# Patient Record
Sex: FEMALE | Race: WHITE | NOT HISPANIC OR LATINO | Employment: OTHER | URBAN - METROPOLITAN AREA
[De-identification: names, ages, dates, MRNs, and addresses within clinical notes are randomized per-mention and may not be internally consistent; named-entity substitution may affect disease eponyms.]

---

## 2018-03-08 ENCOUNTER — OFFICE VISIT (OUTPATIENT)
Dept: MEDICAL GROUP | Facility: PHYSICIAN GROUP | Age: 63
End: 2018-03-08
Payer: COMMERCIAL

## 2018-03-08 VITALS
OXYGEN SATURATION: 94 % | SYSTOLIC BLOOD PRESSURE: 116 MMHG | HEART RATE: 83 BPM | DIASTOLIC BLOOD PRESSURE: 62 MMHG | BODY MASS INDEX: 30.49 KG/M2 | TEMPERATURE: 97.5 F | RESPIRATION RATE: 16 BRPM | WEIGHT: 183 LBS | HEIGHT: 65 IN

## 2018-03-08 DIAGNOSIS — L98.9 SKIN LESION OF FACE: ICD-10-CM

## 2018-03-08 DIAGNOSIS — E03.4 HYPOTHYROIDISM DUE TO ACQUIRED ATROPHY OF THYROID: ICD-10-CM

## 2018-03-08 DIAGNOSIS — Z85.820 HISTORY OF MALIGNANT MELANOMA OF SKIN: ICD-10-CM

## 2018-03-08 DIAGNOSIS — F41.9 ANXIETY: ICD-10-CM

## 2018-03-08 DIAGNOSIS — E78.2 MIXED HYPERLIPIDEMIA: ICD-10-CM

## 2018-03-08 DIAGNOSIS — E66.9 OBESITY (BMI 30-39.9): ICD-10-CM

## 2018-03-08 PROCEDURE — 99214 OFFICE O/P EST MOD 30 MIN: CPT | Performed by: NURSE PRACTITIONER

## 2018-03-08 RX ORDER — ATORVASTATIN CALCIUM 10 MG/1
TABLET, FILM COATED ORAL
COMMUNITY
Start: 2018-02-02 | End: 2018-10-15 | Stop reason: SINTOL

## 2018-03-08 RX ORDER — SERTRALINE HYDROCHLORIDE 25 MG/1
25 TABLET, FILM COATED ORAL DAILY
Qty: 30 TAB | Refills: 0 | Status: SHIPPED | OUTPATIENT
Start: 2018-03-08 | End: 2019-08-20

## 2018-03-08 RX ORDER — LEVOTHYROXINE SODIUM 0.05 MG/1
TABLET ORAL
COMMUNITY
Start: 2018-03-04 | End: 2019-11-01 | Stop reason: SDUPTHER

## 2018-03-08 ASSESSMENT — PATIENT HEALTH QUESTIONNAIRE - PHQ9
CLINICAL INTERPRETATION OF PHQ2 SCORE: 6
5. POOR APPETITE OR OVEREATING: 0 - NOT AT ALL
SUM OF ALL RESPONSES TO PHQ QUESTIONS 1-9: 8

## 2018-03-08 ASSESSMENT — PAIN SCALES - GENERAL: PAINLEVEL: NO PAIN

## 2018-03-08 NOTE — ASSESSMENT & PLAN NOTE
"This is a new problem. Patient states that she has a new job over the last 6 months states that she has had high stress. Patient states that daily she is worrying constantly, feels like she has to protect herself against her coworkers. States that she is constantly stressed, states she has been irritable and more \"snappy\".  "

## 2018-03-08 NOTE — ASSESSMENT & PLAN NOTE
This is a new problem. Patient states that over the last couple of months she has noticed 2 new moles. One near her left upper ear and then right temple. These have grown rapidly and are in the site of previous melanoma. Patient is concerned with these new growths and would like evaluation, removal of these lesions.

## 2018-03-08 NOTE — ASSESSMENT & PLAN NOTE
"Labwork  up to date. Taking medicine as directed. Denies palpitations, skin changes, temperature intolerance, changes in bowel habits. States that she has felt \"different\".    "

## 2018-03-08 NOTE — PROGRESS NOTES
"Chief Complaint   Patient presents with   • Establish Care     New Patient    • Nevus     L side on top of ear        HISTORY OF THE PRESENT ILLNESS: This is a 62 y.o. female new patient to our practice. This pleasant patient is here today to discuss multiple issues including her thyroid.     Skin lesion of face  This is a new problem. Patient states that over the last couple of months she has noticed 2 new moles. One near her left upper ear and then right temple. These have grown rapidly and are in the site of previous melanoma. Patient is concerned with these new growths and would like evaluation, removal of these lesions.    Obesity (BMI 30-39.9)  Chronic in nature. Stable. Patient is counseled regarding healthy diet and exercise.    Mixed hyperlipidemia  Chronic in nature. Stable. Patient states that last labs were within normal limits.    Hypothyroidism due to acquired atrophy of thyroid  Labwork  up to date. Taking medicine as directed. Denies palpitations, skin changes, temperature intolerance, changes in bowel habits. States that she has felt \"different\".      Anxiety  This is a new problem. Patient states that she has a new job over the last 6 months states that she has had high stress. Patient states that daily she is worrying constantly, feels like she has to protect herself against her coworkers. States that she is constantly stressed, states she has been irritable and more \"snappy\".      Past Medical History:   Diagnosis Date   • Hyperlipidemia    • Hypothyroidism        Past Surgical History:   Procedure Laterality Date   • BIOPSY GENERAL      skin       Family Status   Relation Status   • Mother    • Father      Family History   Problem Relation Age of Onset   • Heart Disease Mother      CHF   • Thyroid Mother    • Seizures Mother    • Cancer Father      skin   • Heart Disease Father 54     MI       Social History   Substance Use Topics   • Smoking status: Never Smoker   • Smokeless " "tobacco: Never Used   • Alcohol use Yes      Comment: occ        Allergies: Patient has no known allergies.    Current Outpatient Prescriptions Ordered in [facility]   Medication Sig Dispense Refill   • atorvastatin (LIPITOR) 10 MG Tab      • levothyroxine (SYNTHROID) 50 MCG Tab      • sertraline (ZOLOFT) 25 MG tablet Take 1 Tab by mouth every day. 30 Tab 0     No current Epic-ordered facility-administered medications on file.        Review of Systems   Constitutional:  Negative for fever, chills, weight loss and malaise/fatigue.   HENT:  Negative for ear pain, nosebleeds, congestion, sore throat and neck pain.    Eyes:  Negative for blurred vision.   Respiratory:  Negative for cough, sputum production, shortness of breath and wheezing.    Cardiovascular:  Negative for chest pain, palpitations, orthopnea and leg swelling.   Gastrointestinal:  Negative for heartburn, nausea, vomiting and abdominal pain.   Genitourinary:  Negative for dysuria, urgency and frequency.   Musculoskeletal:  Negative for myalgias, back pain and joint pain.   Skin:  Negative for rash and itching.   Neurological:  Negative for dizziness, tingling, tremors, sensory change, focal weakness and headaches.   Endo/Heme/Allergies:  Does not bruise/bleed easily.   Psychiatric/Behavioral:  Negative for depression, anxiety, or memory loss.     All other systems reviewed and are negative except as in HPI.    Exam: Blood pressure 116/62, pulse 83, temperature 36.4 °C (97.5 °F), resp. rate 16, height 1.651 m (5' 5\"), weight 83 kg (183 lb), SpO2 94 %, not currently breastfeeding.  General:  Normal appearing. No distress.  HEENT:  Normocephalic. Eyes conjunctiva clear lids without ptosis, pupils equal and reactive to light accommodation, ears normal shape and contour, canals are clear bilaterally, tympanic membranes are benign, nasal mucosa benign, oropharynx is without erythema, edema or exudates.   Neck:  Supple without JVD or bruit. Thyroid is not " enlarged.  Pulmonary:  Clear to ausculation.  Normal effort. No rales, ronchi, or wheezing.  Cardiovascular:  Regular rate and rhythm without murmur. Carotid and radial pulses are intact and equal bilaterally.  Abdomen:  Soft, nontender, nondistended. Normal bowel sounds. Liver and spleen are not palpable  Neurologic:  Grossly nonfocal  Lymph:  No cervical, supraclavicular or axillary lymph nodes are palpable  Skin:  Warm and dry.  Patient has a red irritated lesion near her left upper ER, patient also has a small round red lesion on her right temple both lesions are inflamed, irritated, greater than the size of a pencil eraser.  Musculoskeletal:  Normal gait. No extremity cyanosis, clubbing, or edema.  Psych:  Normal mood and affect. Alert and oriented x3. Judgment and insight is normal.    PLAN:    1. Obesity (BMI 30-39.9)  - Patient identified as having weight management issue.  Appropriate orders and counseling given.    2. Skin lesion of face  Referral for urgent follow-up with dermatology related to new lesions at site of previous melanoma which are growing rapidly.    3. Hypothyroidism due to acquired atrophy of thyroid  Continue current medications Synthroid 50 µg plan to recheck labs to verify appropriateness of dose.  - TSH+FREE T4    4. Mixed hyperlipidemia  Continue current medication.  - COMP METABOLIC PANEL; Future  - LIPID PROFILE; Future    5. History of malignant melanoma of skin  - REFERRAL TO DERMATOLOGY    6. Anxiety  Plan to start Zoloft 25 mg daily patient is counseled regarding risks, benefits, side effects of medication. Patient will follow-up in one month to discuss medication.   - sertraline (ZOLOFT) 25 MG tablet; Take 1 Tab by mouth every day.  Dispense: 30 Tab; Refill: 0    Follow-up in one month or sooner if needed. Patient is encouraged to be seen in the emergency room for chest pain, palpitations, shortness of breath, dizziness, severe abdominal pain or other concerning  symptoms.    Please note that this dictation was created using voice recognition software. I have made every reasonable attempt to correct obvious errors, but I expect that there are errors of grammar and possibly content that I did not discover before finalizing the note.      Assessment/Plan  1. Obesity (BMI 30-39.9)  Patient identified as having weight management issue.  Appropriate orders and counseling given.   2. Skin lesion of face     3. Hypothyroidism due to acquired atrophy of thyroid  TSH+FREE T4   4. Mixed hyperlipidemia  COMP METABOLIC PANEL    LIPID PROFILE   5. History of malignant melanoma of skin  REFERRAL TO DERMATOLOGY   6. Anxiety  sertraline (ZOLOFT) 25 MG tablet         I have placed the below orders and discussed them with an approved delegating provider. The MA is performing the below orders under the direction of Dr. Sims.

## 2018-03-08 NOTE — LETTER
[facility]  VALENTE Valerio.P.RGONZÁLEZ  [address]  [place] [address]  Fax: [phone]   Authorization for Release/Disclosure of   Protected Health Information   Name: KIM BURNS : 1955 SSN: [id number]   Address: [facility] [address]  [place] [address] Phone:    [phone] (home)    I authorize the entity listed below to release/disclose the PHI below to:   [facility]/Collin Gaona, A.P.R.N. and VALENTE Valerio.P.R.PELON.   Provider or Entity Name:  [facility] Address   City, [facility]   Phone:      Fax:     Reason for request: continuity of care   Information to be released:    [  ] LAST COLONOSCOPY,  including any PATH REPORT and follow-up  [  ] LAST FIT/COLOGUARD RESULT [  ] LAST DEXA  [  ] LAST MAMMOGRAM  [  ] LAST PAP  [  ] LAST LABS [  ] RETINA EXAM REPORT  [  ] IMMUNIZATION RECORDS  [  x] Release all info      [  ] Check here and initial the line next to each item to release ALL health information INCLUDING  _____ Care and treatment for drug and / or alcohol abuse  _____ HIV testing, infection status, or AIDS  _____ Genetic Testing    DATES OF SERVICE OR TIME PERIOD TO BE DISCLOSED: _____________  I understand and acknowledge that:  * This Authorization may be revoked at any time by you in writing, except if your health information has already been used or disclosed.  * Your health information that will be used or disclosed as a result of you signing this authorization could be re-disclosed by the recipient. If this occurs, your re-disclosed health information may no longer be protected by State or Federal laws.  * You may refuse to sign this Authorization. Your refusal will not affect your ability to obtain treatment.  * This Authorization becomes effective upon signing and will  on (date) __________.      If no date is indicated, this Authorization will  one (1) year from the signature date.    Name: Kim Burns    Signature:   Date:     3/8/2018       PLEASE FAX REQUESTED RECORDS BACK TO: [phone]

## 2018-03-13 ENCOUNTER — TELEPHONE (OUTPATIENT)
Dept: MEDICAL GROUP | Facility: PHYSICIAN GROUP | Age: 63
End: 2018-03-13

## 2018-03-13 NOTE — TELEPHONE ENCOUNTER
Patient called and wanted to let you know she got into Derm. She got an appointment in May. Patient said she got on the cancelations list. Let patient know I would let Collin know. Patient wanted to know if there was anything that could be done to be seen sooner.    Please Advise. GITA

## 2018-03-15 NOTE — TELEPHONE ENCOUNTER
VOICEMAIL  1. Caller Name: Kim Mcginnis                        Call Back Number: [phone] (home)       2. Message: Called and left patient a message to call back regarding referral to Derm.     Called Derm and spoke to Shawna. She made patient a sooner appointment 03/30/2018 @9:45am. Shawna said if patient can show up at 9:30am would be helpful.     3. Patient approves office to leave a detailed voicemail/MyChart message: yes

## 2018-03-30 ENCOUNTER — OFFICE VISIT (OUTPATIENT)
Dept: DERMATOLOGY | Facility: IMAGING CENTER | Age: 63
End: 2018-03-30
Payer: COMMERCIAL

## 2018-03-30 VITALS
DIASTOLIC BLOOD PRESSURE: 68 MMHG | BODY MASS INDEX: 28.61 KG/M2 | SYSTOLIC BLOOD PRESSURE: 118 MMHG | WEIGHT: 178 LBS | HEIGHT: 66 IN | TEMPERATURE: 98.6 F

## 2018-03-30 DIAGNOSIS — Z85.820 HISTORY OF MALIGNANT MELANOMA OF SKIN: ICD-10-CM

## 2018-03-30 DIAGNOSIS — L98.9 SKIN LESION OF FACE: ICD-10-CM

## 2018-03-30 PROCEDURE — 99213 OFFICE O/P EST LOW 20 MIN: CPT | Performed by: NURSE PRACTITIONER

## 2018-03-30 NOTE — ASSESSMENT & PLAN NOTE
Patient reports she was treated for a melanoma years ago in [facility] with excision.

## 2018-04-05 ENCOUNTER — APPOINTMENT (OUTPATIENT)
Dept: MEDICAL GROUP | Facility: PHYSICIAN GROUP | Age: 63
End: 2018-04-05
Payer: COMMERCIAL

## 2018-04-06 ENCOUNTER — PATIENT MESSAGE (OUTPATIENT)
Dept: MEDICAL GROUP | Facility: PHYSICIAN GROUP | Age: 63
End: 2018-04-06

## 2018-04-06 DIAGNOSIS — R17 ELEVATED BILIRUBIN: ICD-10-CM

## 2018-08-14 NOTE — PROGRESS NOTES
"Chief Complaint   Patient presents with   • Other     skin check          HISTORY OF THE PRESENT ILLNESS: Patient is a 62 y.o. female. This pleasant patient is here today with concerns regarding her skin. She has a history of malignant melanoma excised some years ago. She has no known family history of skin cancer. She has never smoked or chewed tobacco.      History of malignant melanoma of skin  Patient reports she was treated for a melanoma years ago in [facility] with excision.     Skin lesion of face  Patient states she noticed a rough lesion near her left ear that has since resolved. Also a rough area near her incision on her previous melanoma excision.       Allergies: Patient has no known allergies.    Current Outpatient Prescriptions   Medication Sig Dispense Refill   • atorvastatin (LIPITOR) 10 MG Tab      • levothyroxine (SYNTHROID) 50 MCG Tab      • sertraline (ZOLOFT) 25 MG tablet Take 1 Tab by mouth every day. 30 Tab 0     No current facility-administered medications for this visit.        Past Medical History:   Diagnosis Date   • History of malignant melanoma of skin 3/30/2018   • Hyperlipidemia    • Hypothyroidism        Past Surgical History:   Procedure Laterality Date   • BIOPSY GENERAL      skin       Social History   Substance Use Topics   • Smoking status: Never Smoker   • Smokeless tobacco: Never Used   • Alcohol use Yes      Comment: occ        Family Status   Relation Status   • Mother    • Father      Family History   Problem Relation Age of Onset   • Heart Disease Mother      CHF   • Thyroid Mother    • Seizures Mother    • Cancer Father      skin   • Heart Disease Father 54     MI       Review of Systems   Constitutional: Negative for fever, chills, weight loss and malaise/fatigue.   Skin: she has felt to rough areas on her skin one of which is resolved completely.    Exam: Blood pressure 118/68, temperature 37 °C (98.6 °F), height 1.676 m (5' 6\"), weight 80.7 kg " (178 lb).  General: Normal appearing. No distress.  An examination was performed including the scalp( including the hair) , head and face, inspection of eyelids, lips , right and left ear externally but not ear canals.  Neck and chest and back.  Including  Both upper and lower extremities, including hands and feet and nails and between fingers and toes.     All areas were found to be within normal limits except as noted in the description below.     Face: Scattered lentigo more on the left than the right.  . Patient describes near her left ear is resolved completely  Right lateral face near the right eye and temple area incision consistent with patient's described excision and flap. Area she describes as feeling rough is patch of dry flaky skin. No lesion is visualized.  Chest with multiple lentigo  and benign-appearing light brown nevi.  I'm unable to evaluate her toenails as they are painted but they'll polish.             Please note that this dictation was created using voice recognition software. I have made every reasonable attempt to correct obvious errors, but I expect that there are errors of grammar and possibly content that I did not discover before finalizing the note.      Assessment/Plan  1. History of malignant melanoma of skin  Patient feels she has adequate information regarding melanoma she watches her skin carefully and now uses sun screen and sun protective clothing.    2. Skin lesion of face  Area she describes on the left is resolved completely area on the right appears to be only dry skin.    Encouraged yearly skin checks. Encouraged her to check her skin carefully and return if she finds anything concerning or if the lesion on her left side becomes visible again.   No

## 2018-09-06 ENCOUNTER — HOSPITAL ENCOUNTER (OUTPATIENT)
Dept: LAB | Facility: MEDICAL CENTER | Age: 63
End: 2018-09-06
Attending: NURSE PRACTITIONER
Payer: MEDICARE

## 2018-09-06 LAB
BILIRUB CONJ SERPL-MCNC: 0.2 MG/DL (ref 0.1–0.5)
BILIRUB INDIRECT SERPL-MCNC: 1.3 MG/DL (ref 0–1)
BILIRUB SERPL-MCNC: 1.5 MG/DL (ref 0.1–1.5)

## 2018-09-06 PROCEDURE — 82247 BILIRUBIN TOTAL: CPT

## 2018-09-06 PROCEDURE — 36415 COLL VENOUS BLD VENIPUNCTURE: CPT

## 2018-09-06 PROCEDURE — 82248 BILIRUBIN DIRECT: CPT

## 2018-10-15 ENCOUNTER — TELEPHONE (OUTPATIENT)
Dept: MEDICAL GROUP | Facility: PHYSICIAN GROUP | Age: 63
End: 2018-10-15

## 2018-10-15 DIAGNOSIS — E78.2 MIXED HYPERLIPIDEMIA: ICD-10-CM

## 2018-10-15 RX ORDER — ROSUVASTATIN CALCIUM 10 MG/1
10 TABLET, COATED ORAL EVERY EVENING
Qty: 30 TAB | Refills: 11 | Status: SHIPPED | OUTPATIENT
Start: 2018-10-15 | End: 2019-08-20

## 2018-10-16 NOTE — TELEPHONE ENCOUNTER
I sent a different medication called rosuvastatin to the pharmacy.  If patient has side effects on this medication I would recommend that she come in for an appointment.

## 2018-10-16 NOTE — TELEPHONE ENCOUNTER
Phone Number Called: [phone] (home)       Message: Called and left patient a message. Let her know different medication was sent to the pharmacy. Lm     Left Message for patient to call back: yes

## 2018-10-16 NOTE — TELEPHONE ENCOUNTER
Pt came into the clinic today and stated the new medication that was prescribed for cholesterol is not sitting well with her and she is getting dizzy and losing focus. She paid for the 90 days and would like another Rx or does she need an appt. Please call the patient with any questions.

## 2019-08-20 ENCOUNTER — HOSPITAL ENCOUNTER (OUTPATIENT)
Dept: LAB | Facility: MEDICAL CENTER | Age: 64
End: 2019-08-20
Attending: NURSE PRACTITIONER
Payer: MEDICARE

## 2019-08-20 ENCOUNTER — OFFICE VISIT (OUTPATIENT)
Dept: MEDICAL GROUP | Facility: PHYSICIAN GROUP | Age: 64
End: 2019-08-20
Payer: MEDICARE

## 2019-08-20 VITALS
HEIGHT: 65 IN | BODY MASS INDEX: 30.82 KG/M2 | HEART RATE: 87 BPM | DIASTOLIC BLOOD PRESSURE: 80 MMHG | TEMPERATURE: 97.6 F | OXYGEN SATURATION: 96 % | RESPIRATION RATE: 14 BRPM | WEIGHT: 185 LBS | SYSTOLIC BLOOD PRESSURE: 124 MMHG

## 2019-08-20 DIAGNOSIS — Z85.820 PERSONAL HISTORY OF MALIGNANT MELANOMA: ICD-10-CM

## 2019-08-20 DIAGNOSIS — E66.9 OBESITY (BMI 30-39.9): ICD-10-CM

## 2019-08-20 DIAGNOSIS — Z78.0 POST-MENOPAUSAL: ICD-10-CM

## 2019-08-20 DIAGNOSIS — Z12.31 ENCOUNTER FOR SCREENING MAMMOGRAM FOR BREAST CANCER: ICD-10-CM

## 2019-08-20 DIAGNOSIS — E03.4 HYPOTHYROIDISM DUE TO ACQUIRED ATROPHY OF THYROID: ICD-10-CM

## 2019-08-20 DIAGNOSIS — E78.2 MIXED HYPERLIPIDEMIA: ICD-10-CM

## 2019-08-20 LAB
ALBUMIN SERPL BCP-MCNC: 4.2 G/DL (ref 3.2–4.9)
ALBUMIN/GLOB SERPL: 1.8 G/DL
ALP SERPL-CCNC: 41 U/L (ref 30–99)
ALT SERPL-CCNC: 21 U/L (ref 2–50)
ANION GAP SERPL CALC-SCNC: 9 MMOL/L (ref 0–11.9)
AST SERPL-CCNC: 17 U/L (ref 12–45)
BASOPHILS # BLD AUTO: 0.3 % (ref 0–1.8)
BASOPHILS # BLD: 0.02 K/UL (ref 0–0.12)
BILIRUB SERPL-MCNC: 1.3 MG/DL (ref 0.1–1.5)
BUN SERPL-MCNC: 14 MG/DL (ref 8–22)
CALCIUM SERPL-MCNC: 9.7 MG/DL (ref 8.5–10.5)
CHLORIDE SERPL-SCNC: 103 MMOL/L (ref 96–112)
CHOLEST SERPL-MCNC: 298 MG/DL (ref 100–199)
CO2 SERPL-SCNC: 27 MMOL/L (ref 20–33)
CREAT SERPL-MCNC: 0.86 MG/DL (ref 0.5–1.4)
EOSINOPHIL # BLD AUTO: 0.19 K/UL (ref 0–0.51)
EOSINOPHIL NFR BLD: 3.1 % (ref 0–6.9)
ERYTHROCYTE [DISTWIDTH] IN BLOOD BY AUTOMATED COUNT: 46.9 FL (ref 35.9–50)
FASTING STATUS PATIENT QL REPORTED: NORMAL
GLOBULIN SER CALC-MCNC: 2.3 G/DL (ref 1.9–3.5)
GLUCOSE SERPL-MCNC: 84 MG/DL (ref 65–99)
HCT VFR BLD AUTO: 46.1 % (ref 37–47)
HDLC SERPL-MCNC: 45 MG/DL
HGB BLD-MCNC: 14.7 G/DL (ref 12–16)
IMM GRANULOCYTES # BLD AUTO: 0.01 K/UL (ref 0–0.11)
IMM GRANULOCYTES NFR BLD AUTO: 0.2 % (ref 0–0.9)
LDLC SERPL CALC-MCNC: 177 MG/DL
LYMPHOCYTES # BLD AUTO: 2.5 K/UL (ref 1–4.8)
LYMPHOCYTES NFR BLD: 40.3 % (ref 22–41)
MCH RBC QN AUTO: 30.2 PG (ref 27–33)
MCHC RBC AUTO-ENTMCNC: 31.9 G/DL (ref 33.6–35)
MCV RBC AUTO: 94.7 FL (ref 81.4–97.8)
MONOCYTES # BLD AUTO: 0.24 K/UL (ref 0–0.85)
MONOCYTES NFR BLD AUTO: 3.9 % (ref 0–13.4)
NEUTROPHILS # BLD AUTO: 3.25 K/UL (ref 2–7.15)
NEUTROPHILS NFR BLD: 52.2 % (ref 44–72)
NRBC # BLD AUTO: 0 K/UL
NRBC BLD-RTO: 0 /100 WBC
PLATELET # BLD AUTO: 157 K/UL (ref 164–446)
PMV BLD AUTO: 12.2 FL (ref 9–12.9)
POTASSIUM SERPL-SCNC: 4 MMOL/L (ref 3.6–5.5)
PROT SERPL-MCNC: 6.5 G/DL (ref 6–8.2)
RBC # BLD AUTO: 4.87 M/UL (ref 4.2–5.4)
SODIUM SERPL-SCNC: 139 MMOL/L (ref 135–145)
TRIGL SERPL-MCNC: 382 MG/DL (ref 0–149)
WBC # BLD AUTO: 6.2 K/UL (ref 4.8–10.8)

## 2019-08-20 PROCEDURE — 99214 OFFICE O/P EST MOD 30 MIN: CPT | Performed by: NURSE PRACTITIONER

## 2019-08-20 PROCEDURE — 85025 COMPLETE CBC W/AUTO DIFF WBC: CPT

## 2019-08-20 PROCEDURE — 80061 LIPID PANEL: CPT

## 2019-08-20 PROCEDURE — 80053 COMPREHEN METABOLIC PANEL: CPT

## 2019-08-20 PROCEDURE — 36415 COLL VENOUS BLD VENIPUNCTURE: CPT

## 2019-08-20 PROCEDURE — 84443 ASSAY THYROID STIM HORMONE: CPT

## 2019-08-20 RX ORDER — PREDNISONE 5 MG/1
TABLET ORAL
Refills: 0 | COMMUNITY
Start: 2019-06-16 | End: 2019-08-20

## 2019-08-20 RX ORDER — AMOXICILLIN AND CLAVULANATE POTASSIUM 875; 125 MG/1; MG/1
1 TABLET, FILM COATED ORAL
Refills: 0 | COMMUNITY
Start: 2019-06-16 | End: 2019-08-20

## 2019-08-20 ASSESSMENT — PATIENT HEALTH QUESTIONNAIRE - PHQ9: CLINICAL INTERPRETATION OF PHQ2 SCORE: 0

## 2019-08-20 NOTE — PROGRESS NOTES
Chief Complaint   Patient presents with   • Orders Needed     Labs        HISTORY OF THE PRESENT ILLNESS: This is a 64 y.o. female established patient who presents today for follow up and management of:    Hypothyroidism due to acquired atrophy of thyroid  Chronic. Patient continues to take levothyroxine. No reports of medication side effects. However, patient states she has had fatigue. States she used to sleep for 7-8 hours but now sleeps for 10 hours each night. She is able to fall asleep easily.    Mixed hyperlipidemia  Chronic. Patient was on statin medication but states she has discontinued it due to intolerable side effects. No reports of any active associated symptoms regarding hyperlipidemia. No reports of chest pain, shortness of breath, abdominal pain, or headaches.     Post-menopausal  Patient is due for DEXA. No reports of any active associated symptoms.    Encounter for screening mammogram for breast cancer  Patient is due for mammogram. No reports of any active breast symptoms.    Obesity (BMI 30-39.9)  Patient states she has been exercising more and watching her diet. States she is going back to [facility] where she will be doing regular walks on the beach.    Personal history of malignant melanoma  Chronic. Patient states she had a melanoma scare about 5 years ago due to a spot on the right side of her face. States she was told that if there is nothing unusual that she is noticing, she does not need to worry about the skin spot. She used to do regular skin check ups with dermatology with last evaluation about one year ago. She is no longer doing regular skin checks and is wondering if she should get an updated skin check soon. Patient states she has a dry skin wart on her back which was already evaluated by a dermatologist who told her it was fine.       Past Medical History:   Diagnosis Date   • History of malignant melanoma of skin 3/30/2018   • Hyperlipidemia    • Hypothyroidism        Past  "Surgical History:   Procedure Laterality Date   • BIOPSY GENERAL      skin       Family Status   Relation Name Status   • Mo     • Fa       Family History   Problem Relation Age of Onset   • Heart Disease Mother         CHF   • Thyroid Mother    • Seizures Mother    • Cancer Father         skin   • Heart Disease Father 54        MI       Social History     Tobacco Use   • Smoking status: Never Smoker   • Smokeless tobacco: Never Used   Substance Use Topics   • Alcohol use: Yes     Comment: occ    • Drug use: No       Allergies: Patient has no known allergies.    Current Outpatient Medications Ordered in Epic   Medication Sig Dispense Refill   • levothyroxine (SYNTHROID) 50 MCG Tab        No current Epic-ordered facility-administered medications on file.        Review of Systems   Constitutional: Fatigue/tiredness. Negative for fever, chills, weight loss.  Respiratory: Negative for cough, sputum production, shortness of breath and wheezing.    Cardiovascular: Negative for chest pain, palpitations, orthopnea and leg swelling.   Skin: Wart on back.  Neurological: Negative for dizziness, tingling, tremors, sensory change, focal weakness and headaches.   Endo/Heme/Allergies: Does not bruise/bleed easily.   Psychiatric/Behavioral: Negative for depression, anxiety, or memory loss.     All other systems reviewed and are negative except as in HPI.    Exam: /80 (BP Location: Left arm, Patient Position: Sitting, BP Cuff Size: Adult)   Pulse 87   Temp 36.4 °C (97.6 °F) (Temporal)   Resp 14   Ht 1.651 m (5' 5\")   Wt 83.9 kg (185 lb)   SpO2 96%   General:  Normal appearing. No distress.  Pulmonary:  Clear to ausculation.  Normal effort. No rales, ronchi, or wheezing.  Cardiovascular:  Regular rate and rhythm without murmur. Carotid and radial pulses are intact and equal bilaterally.  Neurologic:  Grossly nonfocal  Skin:  Warm and dry.  No obvious lesions.  Musculoskeletal:  Normal gait. No extremity " cyanosis, clubbing, or edema.  Psych:  Normal mood and affect. Alert and oriented x3. Judgment and insight is normal.      PLAN:    1. Hypothyroidism due to acquired atrophy of thyroid  - Patient reports having fatigue. She requests for updated labs.  - CBC WITH DIFFERENTIAL; Future  - Comp Metabolic Panel; Future  - Lipid Profile; Future  - TSH WITH REFLEX TO FT4; Future    2. Mixed hyperlipidemia  - Patient states she has stopped taking statin medication due to intolerable side effects. She will manage this with healthy diet and exercise.  - Advised to eat low fat, low carbohydrate and high fiber diet as well as do cardio physical exercise regularly.  .   - Lipid Profile; Future    3. Post-menopausal  - Due for DEXA  - DS-BONE DENSITY STUDY (DEXA); Future    4. Encounter for screening mammogram for breast cancer  - Due for mammogram  - MA-SCREENING MAMMO BILAT W/TOMOSYNTHESIS W/CAD; Future    5. Obesity (BMI 30-39.9)  - Patient identified as having weight management issue.  Appropriate orders and counseling given.    6. Personal history of malignant melanoma  - Patient states she has not had a skin check in a while and would like to get referral to dermatology.  - REFERRAL TO DERMATOLOGY     Follow-up in 1 year or sooner as needed.  Patient is encouraged to be seen in the emergency room for chest pain, palpitations, shortness of breath, dizziness, severe abdominal pain or other concerning symptoms.     Please note that this dictation was created using voice recognition software. I have made every reasonable attempt to correct obvious errors, but I expect that there are errors of grammar and possibly content that I did not discover before finalizing the note.      Assessment/Plan  1. Hypothyroidism due to acquired atrophy of thyroid  CBC WITH DIFFERENTIAL    TSH WITH REFLEX TO FT4    Comp Metabolic Panel    CANCELED: Comp Metabolic Panel    CANCELED: Lipid Profile   2. Mixed hyperlipidemia  Comp Metabolic Panel     Lipid Profile    CANCELED: Lipid Profile   3. Post-menopausal  DS-BONE DENSITY STUDY (DEXA)   4. Encounter for screening mammogram for breast cancer  MA-SCREENING MAMMO BILAT W/TOMOSYNTHESIS W/CAD   5. Obesity (BMI 30-39.9)  Patient identified as having weight management issue.  Appropriate orders and counseling given.   6. Personal history of malignant melanoma  REFERRAL TO DERMATOLOGY         I have placed the below orders and discussed them with an approved delegating provider. The MA is performing the below orders under the direction of Dr. Sims.       Franco RUFF (Scribe), am scribing for, and in the presence of, ELMO Patterson    Electronically signed by: Franco Alcantar (Alleyibe), 8/20/2019    Collin RUFF APRN personally performed the services described in this documentation, as scribed by Franco Alcantar in my presence, and it is both accurate and complete.

## 2019-08-21 LAB — TSH SERPL DL<=0.005 MIU/L-ACNC: 1.76 UIU/ML (ref 0.38–5.33)

## 2019-10-29 DIAGNOSIS — E78.2 MIXED HYPERLIPIDEMIA: ICD-10-CM

## 2019-10-29 RX ORDER — ROSUVASTATIN CALCIUM 10 MG/1
10 TABLET, COATED ORAL EVERY EVENING
Qty: 90 TAB | Refills: 0 | Status: SHIPPED | OUTPATIENT
Start: 2019-10-29 | End: 2020-03-02

## 2019-11-01 ENCOUNTER — OFFICE VISIT (OUTPATIENT)
Dept: MEDICAL GROUP | Facility: PHYSICIAN GROUP | Age: 64
End: 2019-11-01
Payer: MEDICARE

## 2019-11-01 VITALS
HEIGHT: 65 IN | BODY MASS INDEX: 30.96 KG/M2 | RESPIRATION RATE: 16 BRPM | DIASTOLIC BLOOD PRESSURE: 72 MMHG | HEART RATE: 64 BPM | WEIGHT: 185.8 LBS | SYSTOLIC BLOOD PRESSURE: 116 MMHG | TEMPERATURE: 97.4 F | OXYGEN SATURATION: 96 %

## 2019-11-01 DIAGNOSIS — B34.9 ACUTE VIRAL SYNDROME: ICD-10-CM

## 2019-11-01 PROCEDURE — 99214 OFFICE O/P EST MOD 30 MIN: CPT | Performed by: PHYSICIAN ASSISTANT

## 2019-11-01 RX ORDER — LEVOTHYROXINE SODIUM 0.05 MG/1
50 TABLET ORAL
Qty: 30 TAB | Refills: 2 | Status: SHIPPED | OUTPATIENT
Start: 2019-11-01 | End: 2020-02-03

## 2019-11-01 RX ORDER — BENZONATATE 100 MG/1
100 CAPSULE ORAL 3 TIMES DAILY PRN
Qty: 60 CAP | Refills: 0 | Status: SHIPPED | OUTPATIENT
Start: 2019-11-01 | End: 2021-10-25

## 2019-11-01 RX ORDER — ALBUTEROL SULFATE 90 UG/1
2 AEROSOL, METERED RESPIRATORY (INHALATION) EVERY 6 HOURS PRN
Qty: 8.5 G | Refills: 0 | Status: SHIPPED | OUTPATIENT
Start: 2019-11-01 | End: 2021-10-25

## 2019-11-01 NOTE — PROGRESS NOTES
Chief Complaint   Patient presents with   • Cough     x 9 days, productive dry       HISTORY OF PRESENT ILLNESS: Kim Mcginnis is an established 64 y.o. female here to discuss the evaluation and management of:    Patient is a pleasant 64-year-old female here today to discuss nonproductive cough for the past 7 days.  She tells me recently she was in [place] for work trip and while there was on a bus.  States she was sitting close to the  and the  appeared to have an upper respiratory infection and was coughing.  Patient states she stayed in [place] for additional 3 days and was run down by the trip but was asymptomatic.  States after returning she developed a nonproductive cough, scratchy sensation in her chest, intermittent episode of watery eyes, and postnasal drip.  She also tells me she had one episode of mild rhinorrhea and intermittent right ear fullness.  She denies nasal congestion, sore throat, earache, tinnitus, hearing loss, sinus pressure, headache, wheezing, shortness of breath, skin rash, bowel habit changes, nausea, vomiting.  States she is been using over-the-counter TheraFlu, ibuprofen, and Ricola cough drops.  States she is also been using throat lozenges with pseudoephedrine.  She mentions that this does help suppress her cough.  She tells me cough symptoms are worse in the a.m.  She also tells me that she is waking up at night due to cough symptoms.      Patient Active Problem List    Diagnosis Date Noted   • History of malignant melanoma of skin 03/30/2018   • Obesity (BMI 30-39.9) 03/08/2018   • Skin lesion of face 03/08/2018   • Hypothyroidism due to acquired atrophy of thyroid 03/08/2018   • Mixed hyperlipidemia 03/08/2018   • Anxiety 03/08/2018       Allergies:Patient has no known allergies.    Current Outpatient Medications   Medication Sig Dispense Refill   • levothyroxine (SYNTHROID) 50 MCG Tab Take 1 Tab by mouth Every morning on an empty stomach. 30 Tab 2   •  benzonatate (TESSALON) 100 MG Cap Take 1 Cap by mouth 3 times a day as needed for Cough. 60 Cap 0   • albuterol 108 (90 Base) MCG/ACT Aero Soln inhalation aerosol Inhale 2 Puffs by mouth every 6 hours as needed for Shortness of Breath. 8.5 g 0   • rosuvastatin (CRESTOR) 10 MG Tab Take 1 Tab by mouth every evening. 90 Tab 0     No current facility-administered medications for this visit.        Social History     Tobacco Use   • Smoking status: Never Smoker   • Smokeless tobacco: Never Used   Substance Use Topics   • Alcohol use: Yes     Comment: occ    • Drug use: No       Family Status   Relation Name Status   • Mo     • Fa       Family History   Problem Relation Age of Onset   • Heart Disease Mother         CHF   • Thyroid Mother    • Seizures Mother    • Cancer Father         skin   • Heart Disease Father 54        MI       ROS:  Review of Systems   Constitutional: Negative for fever, chills, weight loss and malaise/fatigue.   HENT: Negative for ear pain, nosebleeds, congestion, sore throat and neck pain.  Positive for postnasal drip, right ear fullness.  Positive for watery eyes and scratchy throat.  Eyes: Negative for blurred vision.   Respiratory: Negative for sputum production, shortness of breath and wheezing.  Positive for nonproductive cough.  Cardiovascular: Negative for chest pain, palpitations, orthopnea and leg swelling.   Gastrointestinal: Negative for heartburn, nausea, vomiting and abdominal pain.   Genitourinary: Negative for dysuria, urgency and frequency.   Musculoskeletal: Negative for myalgias, back pain and joint pain.   Skin: Negative for rash and itching.   Neurological: Negative for dizziness, tingling, tremors, sensory change, focal weakness and headaches.   Endo/Heme/Allergies: Does not bruise/bleed easily.   Psychiatric/Behavioral: Negative for depression, suicidal ideas and memory loss.  The patient is not nervous/anxious and does not have insomnia.    All other systems  "reviewed and are negative except as in HPI.    Exam: /72 (BP Location: Left arm, Patient Position: Sitting)   Pulse 64   Temp 36.3 °C (97.4 °F) (Temporal)   Resp 16   Ht 1.651 m (5' 5\")   Wt 84.3 kg (185 lb 12.8 oz)   SpO2 96%  Body mass index is 30.92 kg/m².  General: Normal appearing. No distress.  HEENT: Normocephalic. Eyes conjunctiva clear lids without ptosis, pupils equal and reactive to light accommodation, ears normal shape and contour, right ear canal positive for hard cerumen and unable to visualize tympanic membrane, left tympanic membrane positive for mild erythema with clear effusion,  nasal mucosa benign, oropharynx is without erythema, edema or exudates.  Positive for postnasal drip.  Neck: Supple without JVD or bruit. Thyroid is not enlarged.  Pulmonary: Clear to ausculation.  Normal effort. No rales, ronchi, or wheezing.  Cardiovascular: Regular rate and rhythm without murmur. Carotid and radial pulses are intact and equal bilaterally.  Abdomen: Soft, nontender, nondistended. Normal bowel sounds. Liver and spleen are not palpable  Neurologic: Grossly nonfocal.  Cranial nerves are normal.   Lymph: No cervical, supraclavicular or axillary lymph nodes are palpable  Skin: Warm and dry.  No rashes or suspicious skin lesions.  Musculoskeletal: Normal gait. No extremity cyanosis, clubbing, or edema.  Psych: Normal mood and affect. Alert and oriented x3. Judgment and insight is normal.    Medical decision-making and discussion:  1. Acute viral syndrome    PLAN:  1. Educated patient that on natural course of benign viral URI's.   2. Twice daily use of nasal saline rinse or Neti-Pot encouraged.  Advised patient to use Flonase after rinsing nasal cavities.  Can use Flonase up to twice a day.  3. OTC anti-pyretics and decongestants as needed.  Suggested Mucinex DM or pseudoephedrine.  4.  Patient has been prescribed albuterol inhaler.  Advised patient to use albuterol inhaler for 2 weeks and then " discontinue inhaler.  5.  She has also been prescribed benzonatate.  Advised patient she can take benzonatate prior to bedtime.  6. Follow-up for worsening symptoms, difficulty breathing, lack of expected recovery, or should new symptoms or problems arise.    - benzonatate (TESSALON) 100 MG Cap; Take 1 Cap by mouth 3 times a day as needed for Cough.  Dispense: 60 Cap; Refill: 0  - albuterol 108 (90 Base) MCG/ACT Aero Soln inhalation aerosol; Inhale 2 Puffs by mouth every 6 hours as needed for Shortness of Breath.  Dispense: 8.5 g; Refill: 0    Follow-up for worsening symptoms,lack of expected recovery, or should new symptoms or problems arise.        Please note that this dictation was created using voice recognition software. I have made every reasonable attempt to correct obvious errors, but I expect that there are errors of grammar and possibly content that I did not discover before finalizing the note.    Assessment/Plan:  1. Acute viral syndrome  benzonatate (TESSALON) 100 MG Cap    albuterol 108 (90 Base) MCG/ACT Aero Soln inhalation aerosol       Return if symptoms worsen or fail to improve.

## 2019-11-01 NOTE — TELEPHONE ENCOUNTER
Was the patient seen in the last year in this department? Yes    Does patient have an active prescription for medications requested? No     Received Request Via: Patient     PT OUT. Lm

## 2020-02-03 RX ORDER — LEVOTHYROXINE SODIUM 0.05 MG/1
TABLET ORAL
Qty: 30 TAB | Refills: 0 | Status: SHIPPED | OUTPATIENT
Start: 2020-02-03 | End: 2020-03-02

## 2020-03-01 DIAGNOSIS — E78.2 MIXED HYPERLIPIDEMIA: ICD-10-CM

## 2020-03-02 RX ORDER — LEVOTHYROXINE SODIUM 0.05 MG/1
TABLET ORAL
Qty: 30 TAB | Refills: 0 | Status: SHIPPED | OUTPATIENT
Start: 2020-03-02 | End: 2020-04-06

## 2020-03-02 RX ORDER — ROSUVASTATIN CALCIUM 10 MG/1
TABLET, COATED ORAL
Qty: 90 TAB | Refills: 0 | Status: SHIPPED | OUTPATIENT
Start: 2020-03-02 | End: 2020-10-19

## 2020-04-06 RX ORDER — LEVOTHYROXINE SODIUM 0.05 MG/1
TABLET ORAL
Qty: 90 TAB | Refills: 0 | Status: SHIPPED | OUTPATIENT
Start: 2020-04-06 | End: 2020-07-08

## 2020-05-06 ENCOUNTER — OFFICE VISIT (OUTPATIENT)
Dept: MEDICAL GROUP | Facility: PHYSICIAN GROUP | Age: 65
End: 2020-05-06
Payer: MEDICARE

## 2020-05-06 VITALS
BODY MASS INDEX: 31.65 KG/M2 | HEIGHT: 65 IN | OXYGEN SATURATION: 97 % | TEMPERATURE: 97.6 F | DIASTOLIC BLOOD PRESSURE: 78 MMHG | SYSTOLIC BLOOD PRESSURE: 122 MMHG | HEART RATE: 70 BPM | RESPIRATION RATE: 16 BRPM | WEIGHT: 190 LBS

## 2020-05-06 DIAGNOSIS — F33.1 MODERATE EPISODE OF RECURRENT MAJOR DEPRESSIVE DISORDER (HCC): ICD-10-CM

## 2020-05-06 DIAGNOSIS — E03.4 HYPOTHYROIDISM DUE TO ACQUIRED ATROPHY OF THYROID: ICD-10-CM

## 2020-05-06 DIAGNOSIS — F41.9 ANXIETY: ICD-10-CM

## 2020-05-06 DIAGNOSIS — E78.2 MIXED HYPERLIPIDEMIA: ICD-10-CM

## 2020-05-06 DIAGNOSIS — E66.9 OBESITY (BMI 30-39.9): ICD-10-CM

## 2020-05-06 PROBLEM — F32.9 MAJOR DEPRESSIVE DISORDER: Status: ACTIVE | Noted: 2020-05-06

## 2020-05-06 PROCEDURE — 99214 OFFICE O/P EST MOD 30 MIN: CPT | Performed by: NURSE PRACTITIONER

## 2020-05-06 RX ORDER — ALPRAZOLAM 0.25 MG/1
0.25 TABLET ORAL NIGHTLY PRN
Qty: 30 TAB | Refills: 0 | Status: SHIPPED | OUTPATIENT
Start: 2020-05-06 | End: 2020-06-05

## 2020-05-06 ASSESSMENT — FIBROSIS 4 INDEX: FIB4 SCORE: 1.56

## 2020-05-06 ASSESSMENT — PATIENT HEALTH QUESTIONNAIRE - PHQ9
SUM OF ALL RESPONSES TO PHQ QUESTIONS 1-9: 18
5. POOR APPETITE OR OVEREATING: 3 - NEARLY EVERY DAY
CLINICAL INTERPRETATION OF PHQ2 SCORE: 4

## 2020-05-06 NOTE — ASSESSMENT & PLAN NOTE
Chronic in nature.  Worsening.  3 nights ago pressure in chest, felt faint. Says she was dehydrated.  States she has had severe increasing anxiety related to coronavirus she has had decreased work schedule, significant decrease in her pay, states that her employer is planning on opening up and she does not feel they are providing adequate protection further in place.  Stress neck shoulder pain.  Patient states that she is having severe anxiety waking multiple times a night, experiencing palpitations shortness of breath and feeling of impending doom.  States that she is having difficulty calming her brain down states that she has been depressed.  Is that her main concern is lack of care from her employer.  States that she does want to find another job but states that she wishes there were a way to get unemployment states that all of these issues have compounded.  Patient is requesting a small amount of Xanax states that she took this in the past and that it was very effective states that she took it intermittently.  States that she believes this will be temporary.

## 2020-05-07 NOTE — PROGRESS NOTES
Chief Complaint   Patient presents with   • Requesting Labs     would like labs ordered   • Other     mental stress       HISTORY OF PRESENT ILLNESS: Patient is a 64 y.o. female established patient who presents today to discuss anxiety.    Anxiety  Chronic in nature.  Worsening.  3 nights ago pressure in chest, felt faint. Says she was dehydrated.  States she has had severe increasing anxiety related to coronavirus she has had decreased work schedule, significant decrease in her pay, states that her employer is planning on opening up and she does not feel they are providing adequate protection further in place.  Stress neck shoulder pain.  Patient states that she is having severe anxiety waking multiple times a night, experiencing palpitations shortness of breath and feeling of impending doom.  States that she is having difficulty calming her brain down states that she has been depressed.  Is that her main concern is lack of care from her employer.  States that she does want to find another job but states that she wishes there were a way to get unemployment states that all of these issues have compounded.  Patient is requesting a small amount of Xanax states that she took this in the past and that it was very effective states that she took it intermittently.  States that she believes this will be temporary.    Mixed hyperlipidemia  Chronic in nature.  Labs ordered for update.    Hypothyroidism due to acquired atrophy of thyroid  Chronic in nature.  Stable.  Patient denies symptoms at this time.  Labs are ordered for update.      Patient Active Problem List    Diagnosis Date Noted   • Major depressive disorder 05/06/2020   • History of malignant melanoma of skin 03/30/2018   • Obesity (BMI 30-39.9) 03/08/2018   • Skin lesion of face 03/08/2018   • Hypothyroidism due to acquired atrophy of thyroid 03/08/2018   • Mixed hyperlipidemia 03/08/2018   • Anxiety 03/08/2018       Allergies:Patient has no known  allergies.    Current Outpatient Medications   Medication Sig Dispense Refill   • ALPRAZolam (XANAX) 0.25 MG Tab Take 1 Tab by mouth at bedtime as needed for Sleep for up to 30 days. 30 Tab 0   • levothyroxine (SYNTHROID) 50 MCG Tab TAKE ONE TABLET BY MOUTH EVERY MORNING ON AN EMPTY STOMACH 90 Tab 0   • rosuvastatin (CRESTOR) 10 MG Tab TAKE ONE TABLET BY MOUTH EVERY EVENING 90 Tab 0   • benzonatate (TESSALON) 100 MG Cap Take 1 Cap by mouth 3 times a day as needed for Cough. 60 Cap 0   • albuterol 108 (90 Base) MCG/ACT Aero Soln inhalation aerosol Inhale 2 Puffs by mouth every 6 hours as needed for Shortness of Breath. 8.5 g 0     No current facility-administered medications for this visit.        Social History     Tobacco Use   • Smoking status: Never Smoker   • Smokeless tobacco: Never Used   Substance Use Topics   • Alcohol use: Yes     Comment: occ    • Drug use: No       Family Status   Relation Name Status   • Mo     • Fa       Family History   Problem Relation Age of Onset   • Heart Disease Mother         CHF   • Thyroid Mother    • Seizures Mother    • Cancer Father         skin   • Heart Disease Father 54        MI       Review of Systems:   Constitutional:  Negative for fever, chills, weight loss and malaise/fatigue.   HEENT:  Negative for ear pain, nosebleeds, congestion, sore throat and neck pain.    Eyes:  Negative for blurred vision.   Respiratory:  Negative for cough, sputum production, shortness of breath and wheezing.    Cardiovascular:  Negative for chest pain, palpitations, orthopnea and leg swelling.   Gastrointestinal:  Negative for heartburn, nausea, vomiting and abdominal pain.   Genitourinary:  Negative for dysuria, urgency and frequency.   Musculoskeletal:  Negative for myalgias, back pain and joint pain.   Skin:  Negative for rash and itching.   Neurological:  Negative for dizziness, tingling, tremors, sensory change, focal weakness and headaches.   Endo/Heme/Allergies:   "Does not bruise/bleed easily.   Psychiatric/Behavioral: Positive for depression, negative suicidal ideas and memory loss.  The patient is nervous/anxious and does have insomnia.    All other systems reviewed and are negative except as in HPI.    Exam:  /78   Pulse 70   Temp 36.4 °C (97.6 °F)   Resp 16   Ht 1.651 m (5' 5\")   Wt 86.2 kg (190 lb)   SpO2 97%   General:  Normal appearing. No distress.  HEENT:  Normocephalic. Eyes conjunctiva clear lids without ptosis, pupils equal and reactive to light accommodation, ears normal shape and contour, canals are clear bilaterally, tympanic membranes are benign, nasal mucosa benign, oropharynx is without erythema, edema or exudates.   Neck:  Supple without JVD or bruit. Thyroid is not enlarged.  Pulmonary:  Clear to ausculation.  Normal effort. No rales, ronchi, or wheezing.  Cardiovascular:  Regular rate and rhythm without murmur. Carotid and radial pulses are intact and equal bilaterally.  Abdomen:  Soft, nontender, nondistended. Normal bowel sounds. Liver and spleen are not palpable  Neurologic:  Grossly nonfocal  Lymph:  No cervical, supraclavicular or axillary lymph nodes are palpable  Skin:  Warm and dry.  No obvious lesions.  Musculoskeletal:  Normal gait. No extremity cyanosis, clubbing, or edema.  Psych:  Normal mood and affect. Alert and oriented x3. Judgment and insight is normal.      PLAN:    1. Anxiety  Small prescription for Xanax provided at this time patient will take these sparingly and will follow-up in a month to discuss.  Patient is counseled regarding risks of these medications including addition, sedation patient is aware that she cannot take these medications when she is driving.  Patient will follow-up in 1 month to discuss labs and discuss anxiety and depression going forward will consider daily medication such as Wellbutrin dependent on reassessment in 1 month.  - ALPRAZolam (XANAX) 0.25 MG Tab; Take 1 Tab by mouth at bedtime as needed " for Sleep for up to 30 days.  Dispense: 30 Tab; Refill: 0    2. Mixed hyperlipidemia  - CBC WITH DIFFERENTIAL; Future  - Comp Metabolic Panel; Future  - Lipid Profile; Future    3. Hypothyroidism due to acquired atrophy of thyroid  - TSH WITH REFLEX TO FT4; Future    4. Obesity (BMI 30-39.9)      5. Moderate episode of recurrent major depressive disorder (HCC)  - Patient has been identified as having a positive depression screening. Appropriate orders and counseling have been given.    Follow-up in 1 month or sooner as needed. Patient is encouraged to be seen in the emergency room for chest pain, palpitations, shortness of breath, dizziness, severe abdominal pain or other concerning symptoms.      Please note that this dictation was created using voice recognition software. I have made every reasonable attempt to correct obvious errors, but I expect that there are errors of grammar and possibly content that I did not discover before finalizing the note.    Assessment/Plan:  1. Anxiety  ALPRAZolam (XANAX) 0.25 MG Tab   2. Mixed hyperlipidemia  CBC WITH DIFFERENTIAL    Comp Metabolic Panel    Lipid Profile   3. Hypothyroidism due to acquired atrophy of thyroid  TSH WITH REFLEX TO FT4   4. Obesity (BMI 30-39.9)     5. Moderate episode of recurrent major depressive disorder (HCC)  Patient has been identified as having a positive depression screening. Appropriate orders and counseling have been given.          I have placed the below orders and discussed them with an approved delegating provider. The MA is performing the below orders under the direction of Dr. Sims.

## 2020-07-08 RX ORDER — LEVOTHYROXINE SODIUM 0.05 MG/1
TABLET ORAL
Qty: 90 TAB | Refills: 3 | Status: SHIPPED | OUTPATIENT
Start: 2020-07-08 | End: 2021-07-26 | Stop reason: SDUPTHER

## 2020-08-04 ENCOUNTER — OFFICE VISIT (OUTPATIENT)
Dept: DERMATOLOGY | Facility: IMAGING CENTER | Age: 65
End: 2020-08-04
Payer: MEDICARE

## 2020-08-04 DIAGNOSIS — L57.0 ACTINIC KERATOSES: ICD-10-CM

## 2020-08-04 DIAGNOSIS — D22.9 MULTIPLE MELANOCYTIC NEVI: ICD-10-CM

## 2020-08-04 DIAGNOSIS — D48.5 NEOPLASM OF UNCERTAIN BEHAVIOR OF SKIN: ICD-10-CM

## 2020-08-04 DIAGNOSIS — L81.4 LENTIGINES: ICD-10-CM

## 2020-08-04 DIAGNOSIS — Z12.83 SKIN CANCER SCREENING: ICD-10-CM

## 2020-08-04 DIAGNOSIS — L82.1 SEBORRHEIC KERATOSES: ICD-10-CM

## 2020-08-04 DIAGNOSIS — Z85.820 HISTORY OF MELANOMA: ICD-10-CM

## 2020-08-04 PROCEDURE — 11102 TANGNTL BX SKIN SINGLE LES: CPT | Performed by: DERMATOLOGY

## 2020-08-04 PROCEDURE — 99214 OFFICE O/P EST MOD 30 MIN: CPT | Mod: 25 | Performed by: DERMATOLOGY

## 2020-08-04 PROCEDURE — 17003 DESTRUCT PREMALG LES 2-14: CPT | Performed by: DERMATOLOGY

## 2020-08-04 PROCEDURE — 11103 TANGNTL BX SKIN EA SEP/ADDL: CPT | Performed by: DERMATOLOGY

## 2020-08-04 PROCEDURE — 17000 DESTRUCT PREMALG LESION: CPT | Mod: XS | Performed by: DERMATOLOGY

## 2020-08-04 NOTE — PROGRESS NOTES
DERMATOLOGY NOTE  FOLLOW UP VISIT       Chief complaint: Follow-Up (SHAMA ) and Skin Lesion     HPI: skin lesion   Location: under neath left elbow   Time present: 6 months   Painful lesion: No  Itching lesion: No  Enlarging lesion: No  Anything make it better or worse?no     History of skin cancer: Melanoma under right eye, no SNBx, WLE by plastics 7 years ago   History of precancers/actinic keratoses: Yes, Details: yes   History of biopsies:Yes, Details: .  History of blistering/severe sunburns:yes   Family history of skin cancer:Yes, Details: .  Family history of atypical moles:Yes, Details: .    Past Medical History:   Diagnosis Date   • History of malignant melanoma of skin 3/30/2018   • Hyperlipidemia    • Hypothyroidism         Family History   Problem Relation Age of Onset   • Heart Disease Mother         CHF   • Thyroid Mother    • Seizures Mother    • Cancer Father         skin   • Heart Disease Father 54        MI        Social History     Socioeconomic History   • Marital status:      Spouse name: Not on file   • Number of children: Not on file   • Years of education: Not on file   • Highest education level: Not on file   Occupational History   • Not on file   Social Needs   • Financial resource strain: Not on file   • Food insecurity     Worry: Not on file     Inability: Not on file   • Transportation needs     Medical: Not on file     Non-medical: Not on file   Tobacco Use   • Smoking status: Never Smoker   • Smokeless tobacco: Never Used   Substance and Sexual Activity   • Alcohol use: Yes     Comment: occ    • Drug use: No   • Sexual activity: Not Currently   Lifestyle   • Physical activity     Days per week: Not on file     Minutes per session: Not on file   • Stress: Not on file   Relationships   • Social connections     Talks on phone: Not on file     Gets together: Not on file     Attends [employer] service: Not on file     Active member of club or organization: Not on file     Attends  meetings of clubs or organizations: Not on file     Relationship status: Not on file   • Intimate partner violence     Fear of current or ex partner: Not on file     Emotionally abused: Not on file     Physically abused: Not on file     Forced sexual activity: Not on file   Other Topics Concern   • Not on file   Social History Narrative   • Not on file        No Known Allergies     MEDICATIONS:  Medications relevant to specialty reviewed.    Current Outpatient Medications:   •  levothyroxine (SYNTHROID) 50 MCG Tab, TAKE ONE TABLET BY MOUTH EVERY MORNING ON AN EMPTY STOMACH, Disp: 90 Tab, Rfl: 3  •  rosuvastatin (CRESTOR) 10 MG Tab, TAKE ONE TABLET BY MOUTH EVERY EVENING, Disp: 90 Tab, Rfl: 0  •  benzonatate (TESSALON) 100 MG Cap, Take 1 Cap by mouth 3 times a day as needed for Cough. (Patient not taking: Reported on 8/4/2020), Disp: 60 Cap, Rfl: 0  •  albuterol 108 (90 Base) MCG/ACT Aero Soln inhalation aerosol, Inhale 2 Puffs by mouth every 6 hours as needed for Shortness of Breath., Disp: 8.5 g, Rfl: 0     REVIEW OF SYSTEMS:   Positive for skin (see HPI)  Negative for fevers, chills, weight loss, fatigue, headaches, cough and visual disturbances  All other systems reviewed and are negative.     EXAM:  There were no vitals taken for this visit.  Constitutional: Well-developed, well-nourished, and in no distress.   Head: normocephalic and atraumatic.  Neck: Normal range of motion. Neck supple.   Pulmonary/Chest: Effort normal.   Neurological: Alert and oriented.    A total body skin exam was performed excluding the genitals per patient preference and including the following areas: head (including face), neck, chest, abdomen, groin/buttocks, back, bilateral upper extremities, and bilateral lower extremities with the following pertinent findings listed below. Remaining above-listed examined areas within normal limits / negative for rashes or lesions.   Nail polish on toe nails  - right shoulder 1.5 cm pink pearly  papule with overlying scale  - left chest with 1 cm pink pearly papule with overlying scale  - left elbow with 0.5 cm pink and brown papule  - left forehead x 3 and left jawline x1 with pink gritty papules  -trunk and extremities with scattered skin light and medium brown uniform macules and papules all of which were morphologically similar and benign appearing on exam and with benign-appearing pigment network patterns on dermoscopy   -sun exposed skin of trunk and b/l upper and lower extremities with scattered clinically benign light brown reticulated macules all of which were morphologically similar and none of which were suspicious for skin cancer today on exam  -trunk and extremities with scattered brown-grey, waxy, hyperkeratotic papules   -right cheek with with well healed scar(s) and no evidence of recurrence on inspection or palpation  - neck up lymph nodes examined and no lymphadenopathy appreciated    IMPRESSION / PLAN:    1. Neoplasm of uncertain behavior of skin x3  - Discussed monitoring vs biopsy, patient agreeable to biopsy today, see procedure note below  - Biopsy Procedure Note: biopsy by shave technique  - Location: A. Right shoulder; B. Left chest; C. Left elbow  - Size: as noted in exam  - Total specimens sent for pathology: 3  - Photo taken with patient permission (see media tab)  - Preoperative diagnosis: A,B. R/o bcc vs ak; C. R/o atypia vs SK  - Plan consider WLE vs ED&C for A&B unless superficial; WLE if indicated for C    Shave bx x3   Risks, benefits and alternatives of procedure discussed, verbal consent obtained for photo and written informed consent obtained for procedure. Time out completed. Area of biopsy prepped with alcohol. Anesthesia with 1% lidocaine with epinephrine administered with 30 gauge needle. Shave biopsy of the site performed. Hemostasis achieved with aluminum chloride. Vaseline applied to wound with bandage. Patient tolerated procedure well and there were no  "complications. The specimen was sent to the pathology lab by the staff. Wound care was discussed.'    2. Actinic keratoses  - Discussed precancerous nature of the condition, relationship to ultraviolet light exposure and need for safe sun practices and daily broad spectrum sun protection.   - Discussed treatment options including topical therapies, cryotherapy and photodynamic therapy.   - After discussing treatment options, patient wishes to pursue cryotherapy. Patient was advised that if actinic keratoses persist at one month after treatment, should come in for further evaluation and possible biopsy.     Procedure Note: Informed verbal consent including risk of redness, swelling, pain, blistering, scar, bleeding, infection and need for possible further treatment was obtained. The location(s) was/were identified by the patient and the physician. The lesion(s) was/were treated with cryotherapy for a total of 1 freeze/thaw cycles with the open spray technique. The patient tolerated the procedure well.  Wound care was discussed.   Location: as noted in exam  Total # of lesions treated:  4    3. Multiple melanocytic nevi  -Reviewed moles and melanoma. Patient advised to return sooner than scheduled if concerning changes in moles are noted.  -Reviewed sun protective behavior including sunscreen application and reapplication, appropriate choice of SPF, hat, protective clothing, seeking shade and never choosing to \"lie out\" in the sun.    4. Lentigines  - Discussed benign nature of lesions, related to sun exposure  - Discussed sun protection    5. Seborrheic keratoses  -nature of the diagnosis was discussed in detail  -clinically benign today on exam, reassurance was given  -continue to monitor for any changes, pt to call if any changes occur    6. History of melanoma  -no evidence of recurrence of lesion(s) on exam today   -ROS negative  -no lymphadenopathy  -recommend baseline skin exam for all first degree " relatives  -ABCDEs of melanoma discussed  -patient to call and return to clinic sooner than next scheduled dermatology visit if new/changing/otherwise concerning skin lesions are noted  -recommend diligent photoprotection and regular TSEs    7. Skin cancer screening  Skin cancer education  - discussed importance of sun protective clothing, eyewear  - discussed importance of daily use of broad spectrum sunscreen with SPF 30 or greater, as well as need for reapplication ~every 2 hours when exposed to UVR  - discussed importance of regular self-exams, ideally once per month, every 6-12 months exams in clinic  - ABCDE's of melanoma discussed  - patient to bring any new or concerning lesions to my attention    Return to clinic in: Return in about 6 months (around 2/4/2021) for SHAMA, sooner pending path. and as needed for any new or changing skin lesions.    Diane Rivera M.D.

## 2020-08-10 ENCOUNTER — TELEPHONE (OUTPATIENT)
Dept: DERMATOLOGY | Facility: IMAGING CENTER | Age: 65
End: 2020-08-10

## 2020-09-11 DIAGNOSIS — F41.9 ANXIETY: ICD-10-CM

## 2020-09-11 RX ORDER — ALPRAZOLAM 0.25 MG/1
TABLET ORAL
Qty: 30 TAB | Refills: 0 | OUTPATIENT
Start: 2020-09-11 | End: 2020-10-11

## 2020-10-19 DIAGNOSIS — E78.2 MIXED HYPERLIPIDEMIA: ICD-10-CM

## 2020-10-19 RX ORDER — ROSUVASTATIN CALCIUM 10 MG/1
TABLET, COATED ORAL
Qty: 90 TAB | Refills: 0 | Status: SHIPPED | OUTPATIENT
Start: 2020-10-19 | End: 2021-03-08

## 2021-01-14 ENCOUNTER — HOSPITAL ENCOUNTER (OUTPATIENT)
Dept: RADIOLOGY | Facility: MEDICAL CENTER | Age: 66
End: 2021-01-14
Payer: MEDICARE

## 2021-01-19 ENCOUNTER — TELEPHONE (OUTPATIENT)
Dept: MEDICAL GROUP | Facility: PHYSICIAN GROUP | Age: 66
End: 2021-01-19

## 2021-01-19 DIAGNOSIS — Z78.0 POST-MENOPAUSAL: ICD-10-CM

## 2021-01-19 NOTE — TELEPHONE ENCOUNTER
Good morning Alberto Polanco send a message thru [employer] requesting a referral to see a gynecologist. Please advice.    Thank you.

## 2021-02-19 ENCOUNTER — TELEPHONE (OUTPATIENT)
Dept: MEDICAL GROUP | Facility: PHYSICIAN GROUP | Age: 66
End: 2021-02-19

## 2021-02-19 NOTE — TELEPHONE ENCOUNTER
ESTABLISHED PATIENT PRE-VISIT PLANNING     Patient was NOT contacted to complete PVP.     Note: Patient will not be contacted if there is no indication to call.     1.  Reviewed notes from the last few office visits within the medical group: Yes    2.  If any orders were placed at last visit or intended to be done for this visit (i.e. 6 mos follow-up), do we have Results/Consult Notes?         •  Labs - Labs ordered, NOT completed. Patient advised to complete prior to next appointment.  Note: If patient appointment is for lab review and patient did not complete labs, check with provider if OK to reschedule patient until labs completed.       •  Imaging - Imaging ordered, NOT completed. Patient advised to complete prior to next appointment.       •  Referrals - No referrals were ordered at last office visit.    3. Is this appointment scheduled as a Hospital Follow-Up? No    4.  Immunizations were updated in Epic using Reconcile Outside Information activity? No    5.  Patient is due for the following Health Maintenance Topics:   Health Maintenance Due   Topic Date Due   • Annual Wellness Visit  1955   • IMM DTaP/Tdap/Td Vaccine (1 - Tdap) 07/14/1972   • PAP SMEAR  07/14/1974   • MAMMOGRAM  07/14/1993   • IMM ZOSTER VACCINES (1 of 2) 07/14/2003   • BONE DENSITY  07/14/2018   • IMM PNEUMOCOCCAL VACCINE: 65+ Years (1 of 1 - PPSV23) 07/14/2018   • IMM INFLUENZA (1) 09/01/2020       - Patient plans to schedule appointment for Annual Wellness Visit (AWV), Dexa Scan and Pap.    6.  AHA (Pulse8) form printed for Provider? N/A

## 2021-02-23 ENCOUNTER — APPOINTMENT (OUTPATIENT)
Dept: RADIOLOGY | Facility: MEDICAL CENTER | Age: 66
End: 2021-02-23
Attending: NURSE PRACTITIONER
Payer: MEDICARE

## 2021-02-23 DIAGNOSIS — Z12.31 VISIT FOR SCREENING MAMMOGRAM: ICD-10-CM

## 2021-03-03 DIAGNOSIS — Z23 NEED FOR VACCINATION: ICD-10-CM

## 2021-03-06 DIAGNOSIS — E78.2 MIXED HYPERLIPIDEMIA: ICD-10-CM

## 2021-03-08 RX ORDER — ROSUVASTATIN CALCIUM 10 MG/1
TABLET, COATED ORAL
Qty: 90 TABLET | Refills: 0 | Status: SHIPPED | OUTPATIENT
Start: 2021-03-08 | End: 2021-07-26

## 2021-05-27 ENCOUNTER — GYNECOLOGY VISIT (OUTPATIENT)
Dept: OBGYN | Facility: CLINIC | Age: 66
End: 2021-05-27
Payer: MEDICARE

## 2021-05-27 ENCOUNTER — HOSPITAL ENCOUNTER (OUTPATIENT)
Facility: MEDICAL CENTER | Age: 66
End: 2021-05-27
Attending: OBSTETRICS & GYNECOLOGY
Payer: MEDICARE

## 2021-05-27 VITALS — BODY MASS INDEX: 31.45 KG/M2 | DIASTOLIC BLOOD PRESSURE: 88 MMHG | SYSTOLIC BLOOD PRESSURE: 126 MMHG | WEIGHT: 189 LBS

## 2021-05-27 DIAGNOSIS — Z00.00 ANNUAL PHYSICAL EXAM: ICD-10-CM

## 2021-05-27 DIAGNOSIS — Z12.4 CERVICAL CANCER SCREENING: ICD-10-CM

## 2021-05-27 LAB — AMBIGUOUS DTTM AMBI4: NORMAL

## 2021-05-27 PROCEDURE — G0101 CA SCREEN;PELVIC/BREAST EXAM: HCPCS | Performed by: OBSTETRICS & GYNECOLOGY

## 2021-05-27 PROCEDURE — 87624 HPV HI-RISK TYP POOLED RSLT: CPT

## 2021-05-27 PROCEDURE — 88175 CYTOPATH C/V AUTO FLUID REDO: CPT

## 2021-05-27 ASSESSMENT — FIBROSIS 4 INDEX: FIB4 SCORE: 1.58

## 2021-05-27 NOTE — PROGRESS NOTES
Chief complaint: Annual exam    Kim Mcginnis is a 65 y.o.  female who presents for her Annual Gynecologic Exam      HPI Comments: Pt presents for her annual well woman exam.   Pt has no complaints.  Last gynecologic exam approximately 4 years ago.    Mammogram overdue    Review of Systems:   Pertinent positives documented in HPI and all other systems reviewed & are negative    PGYN Hx: None    All PMH, PSH, allergies, social history and FH reviewed and updated today:  Past Medical History:   Diagnosis Date   • History of malignant melanoma of skin 3/30/2018   • Hyperlipidemia    • Hypothyroidism      Past Surgical History:   Procedure Laterality Date   • BIOPSY GENERAL      skin     Medications:   Current Outpatient Medications Ordered in Epic   Medication Sig Dispense Refill   • levothyroxine (SYNTHROID) 50 MCG Tab TAKE ONE TABLET BY MOUTH EVERY MORNING ON AN EMPTY STOMACH 90 Tab 3   • rosuvastatin (CRESTOR) 10 MG Tab TAKE ONE TABLET BY MOUTH EVERY EVENING (Patient not taking: Reported on 2021) 90 tablet 0   • benzonatate (TESSALON) 100 MG Cap Take 1 Cap by mouth 3 times a day as needed for Cough. (Patient not taking: Reported on 2020) 60 Cap 0   • albuterol 108 (90 Base) MCG/ACT Aero Soln inhalation aerosol Inhale 2 Puffs by mouth every 6 hours as needed for Shortness of Breath. (Patient not taking: Reported on 2021) 8.5 g 0     No current Epic-ordered facility-administered medications on file.     Patient has no known allergies.  Social History     Socioeconomic History   • Marital status:      Spouse name: Not on file   • Number of children: Not on file   • Years of education: Not on file   • Highest education level: Not on file   Occupational History   • Not on file   Tobacco Use   • Smoking status: Never Smoker   • Smokeless tobacco: Never Used   Vaping Use   • Vaping Use: Never used   Substance and Sexual Activity   • Alcohol use: Yes     Comment: occ    • Drug use: No   •  Sexual activity: Not Currently   Other Topics Concern   • Not on file   Social History Narrative   • Not on file     Social Determinants of Health     Financial Resource Strain:    • Difficulty of Paying Living Expenses:    Food Insecurity:    • Worried About Running Out of Food in the Last Year:    • Ran Out of Food in the Last Year:    Transportation Needs:    • Lack of Transportation (Medical):    • Lack of Transportation (Non-Medical):    Physical Activity:    • Days of Exercise per Week:    • Minutes of Exercise per Session:    Stress:    • Feeling of Stress :    Social Connections:    • Frequency of Communication with Friends and Family:    • Frequency of Social Gatherings with Friends and Family:    • Attends [employer] Services:    • Active Member of Clubs or Organizations:    • Attends Club or Organization Meetings:    • Marital Status:    Intimate Partner Violence:    • Fear of Current or Ex-Partner:    • Emotionally Abused:    • Physically Abused:    • Sexually Abused:      Family History   Problem Relation Age of Onset   • Heart Disease Mother         CHF   • Thyroid Mother    • Seizures Mother    • Cancer Father         skin   • Heart Disease Father 54        MI          Objective:   Vital measurements:  /88   Wt 85.7 kg (189 lb)   Body mass index is 31.45 kg/m². (Goal BM I>18 <25)    Physical Exam   Nursing note and vitals reviewed.  Constitutional: She is oriented to person, place, and time. She appears well-developed and well-nourished. No distress.     HEENT:   Head: Normocephalic and atraumatic.   Right Ear: External ear normal.   Left Ear: External ear normal.   Nose: Nose normal.   Eyes: Conjunctivae and EOM are normal. Pupils are equal, round, and reactive to light. No scleral icterus.     Neck: Normal range of motion. Neck supple. No tracheal deviation present. No thyromegaly present. No cervical or supraclavicular lymphadenopathy.    Breast:  Symmetrical, normal consistency without  masses., No dimpling or skin changes, Normal nipples without discharge, no axillary lymphadenopathy    Abdominal: Soft. Bowel sounds are normal. She exhibits no distension and no mass. No tenderness. She has no rebound and no guarding.     Genitourinary:  Pelvic exam was performed with patient supine.  External genitalia with no abnormal pigmentation, labial fusion, rash, tenderness, lesion or injury to the labia bilaterally.  BUS normal  Vagina is pink and moist with no lesions, foul discharge, erythema, tenderness or bleeding. No foreign body around the vagina or signs of injury.   Cervix exhibits no motion tenderness, no discharge and no friability, no lesions.   Uterus is 6 cm and not deviated, not enlarged, not fixed and not tender.  Right adnexa displays no mass, no tenderness and no fullness.  Left adnexa displays no mass, no tenderness and no fullness.     Musculoskeletal: Normal range of motion. Non tender. She exhibits no edema and no tenderness.     Lymphadenopathy: She has no cervical or supraclavicular adenopathy.     Neurological: She is alert and oriented to person, place, and time. She exhibits normal muscle tone.     Skin: Skin is warm and dry. No rash noted. She is not diaphoretic. No erythema. No pallor.     Psychiatric: She has a normal mood and affect. Her behavior is normal. Judgment and thought content normal.        Assessment:     1. Annual physical exam     2. Cervical cancer screening           Plan:   Pap and physical exam performed.  HPV testing also performed.  Will call patient with results  Self breast awareness discussed.  Counseling: mammography screening  Encouraged exercise and proper diet.  Mammograms annually. Patient given order for screening jean carlos mammogram.  See medications and orders placed in encounter report.  Weight bearing exercise daily to strengthen bones  Calcium 1200mg daily and 800 IU daily    If this Pap smear is normal and no HPV is present, discussed with patient  discontinuation of cervical cancer screening at this time.  She does have a history of an abnormal Pap 20+ years ago.  No history of any recent abnormal Paps or HPV infection.  May discontinue cervical cancer screening if desired    Mammogram overdue.  Patient reports she already has an order for another mammogram.    All questions answered

## 2021-05-27 NOTE — NON-PROVIDER
Pt here for new pt appt  Pt states here for annual   Pharmacy verified  Good #:    PAP: 4 yrs ago WNL   MAMMO: 2 yrs ago

## 2021-05-28 LAB
CYTOLOGY REG CYTOL: NORMAL
HPV HR 12 DNA CVX QL NAA+PROBE: NEGATIVE
HPV16 DNA SPEC QL NAA+PROBE: NEGATIVE
HPV18 DNA SPEC QL NAA+PROBE: NEGATIVE
SPECIMEN SOURCE: NORMAL

## 2021-07-26 DIAGNOSIS — E78.2 MIXED HYPERLIPIDEMIA: ICD-10-CM

## 2021-07-26 RX ORDER — ROSUVASTATIN CALCIUM 10 MG/1
TABLET, COATED ORAL
Qty: 90 TABLET | Refills: 0 | Status: SHIPPED | OUTPATIENT
Start: 2021-07-26 | End: 2022-01-31

## 2021-07-26 RX ORDER — LEVOTHYROXINE SODIUM 0.05 MG/1
TABLET ORAL
Qty: 90 TABLET | Refills: 0 | Status: SHIPPED | OUTPATIENT
Start: 2021-07-26 | End: 2021-11-04

## 2021-09-15 ENCOUNTER — APPOINTMENT (OUTPATIENT)
Dept: MEDICAL GROUP | Facility: PHYSICIAN GROUP | Age: 66
End: 2021-09-15
Payer: MEDICARE

## 2021-09-23 ENCOUNTER — OFFICE VISIT (OUTPATIENT)
Dept: MEDICAL GROUP | Facility: PHYSICIAN GROUP | Age: 66
End: 2021-09-23
Payer: MEDICARE

## 2021-09-23 VITALS
RESPIRATION RATE: 18 BRPM | WEIGHT: 184.8 LBS | TEMPERATURE: 98.1 F | OXYGEN SATURATION: 99 % | SYSTOLIC BLOOD PRESSURE: 128 MMHG | DIASTOLIC BLOOD PRESSURE: 68 MMHG | HEART RATE: 69 BPM | BODY MASS INDEX: 30.79 KG/M2 | HEIGHT: 65 IN

## 2021-09-23 DIAGNOSIS — M79.18 RHOMBOID MUSCLE PAIN: ICD-10-CM

## 2021-09-23 PROCEDURE — 99213 OFFICE O/P EST LOW 20 MIN: CPT | Performed by: STUDENT IN AN ORGANIZED HEALTH CARE EDUCATION/TRAINING PROGRAM

## 2021-09-23 ASSESSMENT — PATIENT HEALTH QUESTIONNAIRE - PHQ9
SUM OF ALL RESPONSES TO PHQ9 QUESTIONS 1 AND 2: 0
7. TROUBLE CONCENTRATING ON THINGS, SUCH AS READING THE NEWSPAPER OR WATCHING TELEVISION: NOT AT ALL
2. FEELING DOWN, DEPRESSED, IRRITABLE, OR HOPELESS: NOT AT ALL
1. LITTLE INTEREST OR PLEASURE IN DOING THINGS: NOT AT ALL
6. FEELING BAD ABOUT YOURSELF - OR THAT YOU ARE A FAILURE OR HAVE LET YOURSELF OR YOUR FAMILY DOWN: NOT AL ALL
8. MOVING OR SPEAKING SO SLOWLY THAT OTHER PEOPLE COULD HAVE NOTICED. OR THE OPPOSITE, BEING SO FIGETY OR RESTLESS THAT YOU HAVE BEEN MOVING AROUND A LOT MORE THAN USUAL: NOT AT ALL
4. FEELING TIRED OR HAVING LITTLE ENERGY: NEARLY EVERY DAY
SUM OF ALL RESPONSES TO PHQ QUESTIONS 1-9: 3
5. POOR APPETITE OR OVEREATING: NOT AT ALL
3. TROUBLE FALLING OR STAYING ASLEEP OR SLEEPING TOO MUCH: NOT AT ALL
9. THOUGHTS THAT YOU WOULD BE BETTER OFF DEAD, OR OF HURTING YOURSELF: NOT AT ALL

## 2021-09-23 NOTE — PROGRESS NOTES
"CC:  The encounter diagnosis was Rhomboid muscle pain.    HISTORY OF THE PRESENT ILLNESS: Patient is a 66 y.o. female. This pleasant patient is here today to discuss right-sided back pain. Her  PCP is Collin BORREGO.    This began 6 to 8 weeks ago.  The pain is located right sided paraspinal over the rhomboid muscles.  The pain is constant and sometimes gets as bad as 10 out of 10 she characterizes the pain is gripping/burning.  The pain does radiate down her right arm.  Sitting and typing on her computer makes it worse.  She is attempted treatment with Tylenol, ibuprofen, heating pad, stretching, masseuse, and a chiropractor.  None of these provide complete or lasting relief.  She has not experienced anything like this in the past.  No associated trauma or overuse incident.    No problem-specific Assessment & Plan notes found for this encounter.      Current Outpatient Medications Ordered in Epic   Medication Sig Dispense Refill   • rosuvastatin (CRESTOR) 10 MG Tab TAKE ONE TABLET BY MOUTH EVERY EVENING (Patient taking differently: 10 mg every 48 hours.) 90 tablet 0   • levothyroxine (SYNTHROID) 50 MCG Tab TAKE ONE TABLET BY MOUTH EVERY MORNING ON AN EMPTY STOMACH 90 tablet 0   • benzonatate (TESSALON) 100 MG Cap Take 1 Cap by mouth 3 times a day as needed for Cough. 60 Cap 0   • albuterol 108 (90 Base) MCG/ACT Aero Soln inhalation aerosol Inhale 2 Puffs by mouth every 6 hours as needed for Shortness of Breath. (Patient not taking: Reported on 5/27/2021) 8.5 g 0     No current Epic-ordered facility-administered medications on file.     ROS:   Gen: no fevers/chills, unplanned changes in weight  Pulm: no sob, no cough  CV: no chest pain/pressure, no palpitations  MSk: See HPI  Skin: no rash      Objective:     Exam: /68 (BP Location: Left arm, Patient Position: Sitting, BP Cuff Size: Adult)   Pulse 69   Temp 36.7 °C (98.1 °F) (Temporal)   Resp 18   Ht 1.651 m (5' 5\")   Wt 83.8 kg (184 lb 12.8 oz)   " SpO2 99%  Body mass index is 30.75 kg/m².    General: Normal appearing. No distress.  Pulmonary: Clear to ausculation.  Normal effort. No rales, ronchi, or wheezing.  Cardiovascular: Regular rate and rhythm without murmur.  Neurologic: Grossly nonfocal  Skin: Warm and dry.  No obvious lesions.  Musculoskeletal: Normal gait. No extremity cyanosis, clubbing, or edema.  Right-sided paraspinal tenderness intermediate between the margin of the right scapula and the spine.  No nodule could be palpated.  No heat, erythema, rash.  Normal active range of motion.    A chaperone was offered to the patient during today's exam. Patient declined chaperone.    Labs: No pertinent labs    Assessment & Plan:   66 y.o. female with the following -    1.  Right-sided rhomboid pain  -Acute, uncomplicated injury, stable.  Inadequately treated.  Failure with conventional therapy.  -I have recommended the patient try to find a masseuse with experience working with athletes intent more vigorous massage of this region of pain.  -I have provided a referral to physiatry for steroid injection or other therapy is indicated if the above recommendations fail to produce adequate lasting pain relief.  -Continue with ibuprofen/Tylenol/heating pad if these help.    Return if symptoms worsen or fail to improve.    Please note that this dictation was created using voice recognition software. I have made every reasonable attempt to correct obvious errors, but I expect that there are errors of grammar and possibly content that I did not discover before finalizing the note.    Alejandro Zheng PA-C 9/23/2021

## 2021-09-29 ENCOUNTER — TELEPHONE (OUTPATIENT)
Dept: MEDICAL GROUP | Facility: PHYSICIAN GROUP | Age: 66
End: 2021-09-29

## 2021-09-29 ENCOUNTER — HOSPITAL ENCOUNTER (OUTPATIENT)
Dept: RADIOLOGY | Facility: MEDICAL CENTER | Age: 66
End: 2021-09-29
Attending: STUDENT IN AN ORGANIZED HEALTH CARE EDUCATION/TRAINING PROGRAM
Payer: MEDICARE

## 2021-09-29 DIAGNOSIS — M79.18 RHOMBOID MUSCLE PAIN: ICD-10-CM

## 2021-09-29 PROCEDURE — 72040 X-RAY EXAM NECK SPINE 2-3 VW: CPT

## 2021-09-29 NOTE — PROGRESS NOTES
Physiatry has requested cervical spine x-ray before they will provide pain management for rhomboid pain.    I have provided the patient with this order.    Alejandro Zheng PA-C 9/29/2021

## 2021-09-29 NOTE — TELEPHONE ENCOUNTER
Pt is here to ask for a order for xray. She said that before she can get the shot she has to have an xray. Please call pt with any questions

## 2021-10-08 ENCOUNTER — OFFICE VISIT (OUTPATIENT)
Dept: PHYSICAL MEDICINE AND REHAB | Facility: MEDICAL CENTER | Age: 66
End: 2021-10-08
Payer: MEDICARE

## 2021-10-08 VITALS
OXYGEN SATURATION: 97 % | WEIGHT: 187.83 LBS | DIASTOLIC BLOOD PRESSURE: 72 MMHG | TEMPERATURE: 97.2 F | HEART RATE: 75 BPM | SYSTOLIC BLOOD PRESSURE: 128 MMHG | BODY MASS INDEX: 31.29 KG/M2 | HEIGHT: 65 IN

## 2021-10-08 DIAGNOSIS — M54.2 CERVICALGIA: ICD-10-CM

## 2021-10-08 DIAGNOSIS — M79.10 MYALGIA: ICD-10-CM

## 2021-10-08 PROCEDURE — 99204 OFFICE O/P NEW MOD 45 MIN: CPT | Performed by: STUDENT IN AN ORGANIZED HEALTH CARE EDUCATION/TRAINING PROGRAM

## 2021-10-08 RX ORDER — METHOCARBAMOL 500 MG/1
500-1000 TABLET, FILM COATED ORAL 4 TIMES DAILY PRN
Qty: 90 TABLET | Refills: 2 | Status: SHIPPED | OUTPATIENT
Start: 2021-10-08 | End: 2023-06-22

## 2021-10-08 ASSESSMENT — PATIENT HEALTH QUESTIONNAIRE - PHQ9
SUM OF ALL RESPONSES TO PHQ QUESTIONS 1-9: 4
CLINICAL INTERPRETATION OF PHQ2 SCORE: 2
5. POOR APPETITE OR OVEREATING: 0 - NOT AT ALL

## 2021-10-08 ASSESSMENT — PAIN SCALES - GENERAL: PAINLEVEL: 1=MINIMAL PAIN

## 2021-10-08 NOTE — PROGRESS NOTES
New Patient Note    Interventional Pain and Spine  Physiatry (Physical Medicine and Rehabilitation)     Patient Name: Kim Mcginnis  : 1955  Date of Service: 10/8/2021  PCP: AMADOR Valerio  Referring Provider: Alejandro Zheng P.A.*    Chief Complaint:   Chief Complaint   Patient presents with   • New Patient     Rhomboid Muscle Pain       HISTORY    HPI:  Kim Mcginnis is a 66 y.o. female who presents today with neck pain that radiates to her right shoulder blade and right upper arm with associated occasional numbness. This pain began many years ago but flared up approximately 6 weeks ago after a trip to [place]. Denies recent trauma. Her pain at its best-worse level is 0-9/10, respectively. Pain right now is 0/10 on the numeric pain scale. Pain worsens over the course of the day such that by 1-2pm it is unbearable. Pain improves with a heating pad. Her pain interferes somewhat with ADLs and limits her ability to work as an . The patient endorses new numbness in right upper arm. She would like to return to playing golf. Also endorses muscular type pain over lateral right forearm    The patient has not done physical therapy for this problem.  The patient denies smoking    Current medications are providing some benefit    Patient has tried the following medications with varied success (current meds in bold): ibuprofen PRN ~8 tabs per day, tylenol 2 tabs per day    Therapeutic modalities and interventional therapies to date include:  - heating pad (relief), sports massage (1 day relief), otherwise none    Medical history includes hypothyroidism    Psychological testing for pain as depression and pain commonly coexist and need to both be treated.     Opioid Risk Score: 0     Interpretation of Opioid Risk Score   Score 0-3 = Low risk of abuse. Do UDS at least once per year.  Score 4-7 = Moderate risk of abuse. Do UDS 1-4 times per year.  Score 8+ = High risk of  abuse. Refer to specialist.    PHQ  Depression Screen (PHQ-2/PHQ-9) 5/6/2020 9/23/2021 10/8/2021   PHQ-2 Total Score - 0 -   PHQ-2 Total Score 4 - 2   PHQ-9 Total Score - 3 -   PHQ-9 Total Score 18 - 4       Interpretation of PHQ-9 Total Score   Score Severity   1-4 No Depression   5-9 Mild Depression   10-14 Moderate Depression   15-19 Moderately Severe Depression   20-27 Severe Depression      Medical records review:  I reviewed the note from the referring provider Alejandro Zheng P.A.* including the note dated 9/23/21    ROS:   Red Flags ROS:   Fever, Chills, Sweats: Denies  Involuntary Weight Loss: Denies  Bladder Incontinence: Denies  Bowel Incontinence: denies  Saddle Anesthesia: Denies    All other systems reviewed and negative.     PMHx:   Past Medical History:   Diagnosis Date   • History of malignant melanoma of skin 3/30/2018   • Hyperlipidemia    • Hypothyroidism        PSHx:   Past Surgical History:   Procedure Laterality Date   • BIOPSY GENERAL      skin       Family Hx:   Family History   Problem Relation Age of Onset   • Heart Disease Mother         CHF   • Thyroid Mother    • Seizures Mother    • Cancer Father         skin   • Heart Disease Father 54        MI       Social Hx:  Social History     Socioeconomic History   • Marital status:      Spouse name: Not on file   • Number of children: Not on file   • Years of education: Not on file   • Highest education level: Not on file   Occupational History   • Not on file   Tobacco Use   • Smoking status: Never Smoker   • Smokeless tobacco: Never Used   Vaping Use   • Vaping Use: Never used   Substance and Sexual Activity   • Alcohol use: Yes     Alcohol/week: 1.8 oz     Types: 3 Standard drinks or equivalent per week     Comment: occasional   • Drug use: Not Currently     Types: Marijuana   • Sexual activity: Not Currently   Other Topics Concern   • Not on file   Social History Narrative   • Not on file     Social Determinants of Health      Financial Resource Strain:    • Difficulty of Paying Living Expenses:    Food Insecurity:    • Worried About Running Out of Food in the Last Year:    • Ran Out of Food in the Last Year:    Transportation Needs:    • Lack of Transportation (Medical):    • Lack of Transportation (Non-Medical):    Physical Activity:    • Days of Exercise per Week:    • Minutes of Exercise per Session:    Stress:    • Feeling of Stress :    Social Connections:    • Frequency of Communication with Friends and Family:    • Frequency of Social Gatherings with Friends and Family:    • Attends [employer] Services:    • Active Member of Clubs or Organizations:    • Attends Club or Organization Meetings:    • Marital Status:    Intimate Partner Violence:    • Fear of Current or Ex-Partner:    • Emotionally Abused:    • Physically Abused:    • Sexually Abused:        Allergies:  Allergies   Allergen Reactions   • Grass Pollen(K-O-[id number]-Swt Robby)        Medications: reviewed on epic.   Outpatient Medications Marked as Taking for the 10/8/21 encounter (Office Visit) with Shruthi Winn M.D.   Medication Sig Dispense Refill   • methocarbamol (ROBAXIN) 500 MG Tab Take 1-2 Tablets by mouth 4 times a day as needed. 90 Tablet 2   • rosuvastatin (CRESTOR) 10 MG Tab TAKE ONE TABLET BY MOUTH EVERY EVENING (Patient taking differently: 10 mg every 48 hours.) 90 tablet 0   • levothyroxine (SYNTHROID) 50 MCG Tab TAKE ONE TABLET BY MOUTH EVERY MORNING ON AN EMPTY STOMACH 90 tablet 0   • benzonatate (TESSALON) 100 MG Cap Take 1 Cap by mouth 3 times a day as needed for Cough. 60 Cap 0   • albuterol 108 (90 Base) MCG/ACT Aero Soln inhalation aerosol Inhale 2 Puffs by mouth every 6 hours as needed for Shortness of Breath. 8.5 g 0        Current Outpatient Medications on File Prior to Visit   Medication Sig Dispense Refill   • rosuvastatin (CRESTOR) 10 MG Tab TAKE ONE TABLET BY MOUTH EVERY EVENING (Patient taking differently: 10 mg every 48 hours.) 90 tablet 0  "  • levothyroxine (SYNTHROID) 50 MCG Tab TAKE ONE TABLET BY MOUTH EVERY MORNING ON AN EMPTY STOMACH 90 tablet 0   • benzonatate (TESSALON) 100 MG Cap Take 1 Cap by mouth 3 times a day as needed for Cough. 60 Cap 0   • albuterol 108 (90 Base) MCG/ACT Aero Soln inhalation aerosol Inhale 2 Puffs by mouth every 6 hours as needed for Shortness of Breath. 8.5 g 0     No current facility-administered medications on file prior to visit.         EXAMINATION     Physical Exam:   /72 (BP Location: Right arm, Patient Position: Sitting, BP Cuff Size: Adult)   Pulse 75   Temp 36.2 °C (97.2 °F) (Temporal)   Ht 1.651 m (5' 5\")   Wt 85.2 kg (187 lb 13.3 oz)   SpO2 97%     Constitutional:   Body Habitus: Body mass index is 31.26 kg/m².  Cooperation: Fully cooperates with exam  Appearance: Well-groomed, well-nourished, not disheveled    Eyes: No scleral icterus to suggest severe liver disease, no proptosis to suggest severe hyperthyroidism    ENT -no obvious auditory deficits, no obvious tongue lesions, tongue midline, no facial droop     Skin -no rashes or lesions noted     Respiratory-  breathing comfortably on room air, no audible wheezing    Cardiovascular- capillary refills less than 2 seconds. No lower extremity edema is noted.     Gastrointestinal - no obvious abdominal masses, non-distended    Psychiatric- alert and oriented ×3. Normal affect.     Gait - normal gait    Musculoskeletal and Neuro -     Cervical spine   Inspection: No deformities of the skin over the cervical spine. No rashes or lesions.    limited active range of motion with cervical flexion and rotation and bending to left reproducing right-sided neck pain, otherwise full ROM in all directions    Spurling's sign: negative bilaterally  Cervical facet loading neg bilaterally    No signs of muscular atrophy in bilateral upper extremities     Palpable trigger points at right paracervical muscles, upper trapezius, rhomboid, infraspinatus and supraspinatus " No TTP at cervical spinous processes.     Cozens neg on R arm    Key points for the international standards for neurological classification of spinal cord injury (ISNCSCI) to light touch.     Dermatome R L   C4 2 2   C5 2 2   C6 2 2   C7 2 2   C8 2 2   T1 2 2   T2 2 2       Motor Exam Upper Extremities   ? Myotome R L   Shoulder flexion C5 5 5   Elbow flexion C5 5 5   Wrist extension C6 5 5   Elbow extension C7 5 5   Finger flexion C8 5 5   Finger abduction T1 5 5     Reflexes  ?  R L   Biceps  2+ 2+   Brachioradialis  2+ 2+     Schofield's sign negative bilaterally        MEDICAL DECISION MAKING    Medical records review: see under HPI section.     DATA    Labs:   Lab Results   Component Value Date/Time    SODIUM 139 08/20/2019 01:31 PM    POTASSIUM 4.0 08/20/2019 01:31 PM    CHLORIDE 103 08/20/2019 01:31 PM    CO2 27 08/20/2019 01:31 PM    ANION 9.0 08/20/2019 01:31 PM    GLUCOSE 84 08/20/2019 01:31 PM    BUN 14 08/20/2019 01:31 PM    CREATININE 0.86 08/20/2019 01:31 PM    CALCIUM 9.7 08/20/2019 01:31 PM    ASTSGOT 17 08/20/2019 01:31 PM    ALTSGPT 21 08/20/2019 01:31 PM    TBILIRUBIN 1.3 08/20/2019 01:31 PM    ALBUMIN 4.2 08/20/2019 01:31 PM    TOTPROTEIN 6.5 08/20/2019 01:31 PM    GLOBULIN 2.3 08/20/2019 01:31 PM    AGRATIO 1.8 08/20/2019 01:31 PM       No results found for: PROTHROMBTM, INR     Lab Results   Component Value Date/Time    WBC 6.2 08/20/2019 01:31 PM    RBC 4.87 08/20/2019 01:31 PM    HEMOGLOBIN 14.7 08/20/2019 01:31 PM    HEMATOCRIT 46.1 08/20/2019 01:31 PM    MCV 94.7 08/20/2019 01:31 PM    MCH 30.2 08/20/2019 01:31 PM    MCHC 31.9 (L) 08/20/2019 01:31 PM    MPV 12.2 08/20/2019 01:31 PM    NEUTSPOLYS 52.20 08/20/2019 01:31 PM    LYMPHOCYTES 40.30 08/20/2019 01:31 PM    MONOCYTES 3.90 08/20/2019 01:31 PM    EOSINOPHILS 3.10 08/20/2019 01:31 PM    BASOPHILS 0.30 08/20/2019 01:31 PM        No results found for: HBA1C     Imaging:   I personally reviewed following images, these are my reads  XR  cervical spine 9/29/21: degenerative disc disease most pronounced at C5-C6 and C6-C7 with multilevel facet arthropathy most pronounced at left C5-C6 and C6-C7. Loss of cervical lordosis, minimal anterolisthesis at C4-C5.    IMAGING radiology reads. I reviewed the following radiology reads                                              Results for orders placed during the hospital encounter of 09/29/21    DX-CERVICAL SPINE-2 OR 3 VIEWS    Impression  1.  No compression deformity or acute fracture.    2.  There is degenerative disc disease and facet arthropathy which is most prominent at the C5-C7 levels.    3.  Mild anterolisthesis at C4-C5.                                                Diagnosis   Visit Diagnoses     ICD-10-CM   1. Cervicalgia  M54.2   2. Myalgia  M79.10           ASSESSMENT AND PLAN:  Kim Mcginnis is a 66 y.o. female with history of hypothyroidism who presents with acute on chronic pain in right paracervical muscles,  upper trapezius, rhomboid, infraspinatus and supraspinatus with palpable trigger points on exam suggestive of myalgias as well as pain radiating from neck down to upper arm with associated numbness suggestive of cervical radicultiis.     Kim was seen today for new patient.    Diagnoses and all orders for this visit:    Cervicalgia  -     REFERRAL FOR ACUPUNCTURE  -     MR-CERVICAL SPINE-W/O; Future    Myalgia  -     REFERRAL FOR ACUPUNCTURE  -     REFERRAL TO PHYSICAL THERAPY    Other orders  -     methocarbamol (ROBAXIN) 500 MG Tab; Take 1-2 Tablets by mouth 4 times a day as needed.        Encounter Diagnoses   Name Primary?   • Cervicalgia    • Myalgia        The above note documents my personal evaluation of this patient. In addition, I have reviewed and confirmed with the patient and MA the supportive information documented in today's Patient Health Questionnaire and Office Note.       PLAN  Physical Therapy: I ordered physical therapy to focus on strengthening and stretching  as well as a home exercise program.     Diagnostic workup: as above given symptoms of RUE radiculitis     Medications: given the myofascial component of pain, we will start robaxin as above    Interventions: discussed possible trigger point injections today, patient elected to defer at this time    Follow-up: after the above diagnostic studies    Orders Placed This Encounter   • MR-CERVICAL SPINE-W/O   • REFERRAL FOR ACUPUNCTURE   • REFERRAL TO PHYSICAL THERAPY   • methocarbamol (ROBAXIN) 500 MG Tab         No follow-ups on file.    Shruthi Winn MD  Interventional Pain and Spine  Physical Medicine and Rehabilitation  [facility]    CC Alejandro Zheng, P.A.*

## 2021-10-18 ENCOUNTER — APPOINTMENT (OUTPATIENT)
Dept: RADIOLOGY | Facility: MEDICAL CENTER | Age: 66
End: 2021-10-18
Attending: STUDENT IN AN ORGANIZED HEALTH CARE EDUCATION/TRAINING PROGRAM
Payer: MEDICARE

## 2021-10-18 ENCOUNTER — TELEPHONE (OUTPATIENT)
Dept: HEALTH INFORMATION MANAGEMENT | Facility: OTHER | Age: 66
End: 2021-10-18

## 2021-10-18 NOTE — TELEPHONE ENCOUNTER
HIPAA verified - Member declined as she is already established with [facility] PCP and will be keeping her for 2022. Member asked if [facility] would be billed through [facility]. Informed this would be set up with [facility]. Offered to provide number she can call to set this up. She declined for now, as she had another call she needed to take.     -aep

## 2021-10-25 ENCOUNTER — OFFICE VISIT (OUTPATIENT)
Dept: MEDICAL GROUP | Facility: PHYSICIAN GROUP | Age: 66
End: 2021-10-25
Payer: MEDICARE

## 2021-10-25 ENCOUNTER — HOSPITAL ENCOUNTER (OUTPATIENT)
Dept: LAB | Facility: MEDICAL CENTER | Age: 66
End: 2021-10-25
Attending: NURSE PRACTITIONER
Payer: MEDICARE

## 2021-10-25 VITALS
RESPIRATION RATE: 20 BRPM | WEIGHT: 191.2 LBS | TEMPERATURE: 98.3 F | HEART RATE: 76 BPM | BODY MASS INDEX: 31.86 KG/M2 | HEIGHT: 65 IN | DIASTOLIC BLOOD PRESSURE: 78 MMHG | SYSTOLIC BLOOD PRESSURE: 126 MMHG | OXYGEN SATURATION: 95 %

## 2021-10-25 DIAGNOSIS — E66.9 OBESITY (BMI 30-39.9): ICD-10-CM

## 2021-10-25 DIAGNOSIS — F41.9 ANXIETY: ICD-10-CM

## 2021-10-25 DIAGNOSIS — E78.2 MIXED HYPERLIPIDEMIA: ICD-10-CM

## 2021-10-25 DIAGNOSIS — Z78.0 POST-MENOPAUSAL: ICD-10-CM

## 2021-10-25 DIAGNOSIS — L03.115 CELLULITIS OF RIGHT LOWER EXTREMITY: ICD-10-CM

## 2021-10-25 DIAGNOSIS — E03.4 HYPOTHYROIDISM DUE TO ACQUIRED ATROPHY OF THYROID: ICD-10-CM

## 2021-10-25 DIAGNOSIS — Z12.31 ENCOUNTER FOR SCREENING MAMMOGRAM FOR MALIGNANT NEOPLASM OF BREAST: ICD-10-CM

## 2021-10-25 DIAGNOSIS — T63.301A ALLERGIC REACTION TO SPIDER BITE, ACCIDENTAL OR UNINTENTIONAL, INITIAL ENCOUNTER: ICD-10-CM

## 2021-10-25 PROBLEM — L98.9 SKIN LESION OF FACE: Status: RESOLVED | Noted: 2018-03-08 | Resolved: 2021-10-25

## 2021-10-25 PROBLEM — Z00.00 ANNUAL PHYSICAL EXAM: Status: RESOLVED | Noted: 2021-05-27 | Resolved: 2021-10-25

## 2021-10-25 LAB
ALBUMIN SERPL BCP-MCNC: 4.7 G/DL (ref 3.2–4.9)
ALBUMIN/GLOB SERPL: 2 G/DL
ALP SERPL-CCNC: 53 U/L (ref 30–99)
ALT SERPL-CCNC: 19 U/L (ref 2–50)
ANION GAP SERPL CALC-SCNC: 10 MMOL/L (ref 7–16)
AST SERPL-CCNC: 14 U/L (ref 12–45)
BASOPHILS # BLD AUTO: 0.3 % (ref 0–1.8)
BASOPHILS # BLD: 0.02 K/UL (ref 0–0.12)
BILIRUB SERPL-MCNC: 1.3 MG/DL (ref 0.1–1.5)
BUN SERPL-MCNC: 14 MG/DL (ref 8–22)
CALCIUM SERPL-MCNC: 9.7 MG/DL (ref 8.5–10.5)
CHLORIDE SERPL-SCNC: 103 MMOL/L (ref 96–112)
CHOLEST SERPL-MCNC: 209 MG/DL (ref 100–199)
CO2 SERPL-SCNC: 26 MMOL/L (ref 20–33)
CREAT SERPL-MCNC: 0.91 MG/DL (ref 0.5–1.4)
EOSINOPHIL # BLD AUTO: 0.25 K/UL (ref 0–0.51)
EOSINOPHIL NFR BLD: 4 % (ref 0–6.9)
ERYTHROCYTE [DISTWIDTH] IN BLOOD BY AUTOMATED COUNT: 47.9 FL (ref 35.9–50)
FASTING STATUS PATIENT QL REPORTED: NORMAL
GLOBULIN SER CALC-MCNC: 2.3 G/DL (ref 1.9–3.5)
GLUCOSE SERPL-MCNC: 93 MG/DL (ref 65–99)
HCT VFR BLD AUTO: 46.4 % (ref 37–47)
HDLC SERPL-MCNC: 57 MG/DL
HGB BLD-MCNC: 15.1 G/DL (ref 12–16)
IMM GRANULOCYTES # BLD AUTO: 0.02 K/UL (ref 0–0.11)
IMM GRANULOCYTES NFR BLD AUTO: 0.3 % (ref 0–0.9)
LDLC SERPL CALC-MCNC: 105 MG/DL
LYMPHOCYTES # BLD AUTO: 2.47 K/UL (ref 1–4.8)
LYMPHOCYTES NFR BLD: 39.1 % (ref 22–41)
MCH RBC QN AUTO: 30 PG (ref 27–33)
MCHC RBC AUTO-ENTMCNC: 32.5 G/DL (ref 33.6–35)
MCV RBC AUTO: 92.1 FL (ref 81.4–97.8)
MONOCYTES # BLD AUTO: 0.28 K/UL (ref 0–0.85)
MONOCYTES NFR BLD AUTO: 4.4 % (ref 0–13.4)
NEUTROPHILS # BLD AUTO: 3.28 K/UL (ref 2–7.15)
NEUTROPHILS NFR BLD: 51.9 % (ref 44–72)
NRBC # BLD AUTO: 0 K/UL
NRBC BLD-RTO: 0 /100 WBC
PLATELET # BLD AUTO: 188 K/UL (ref 164–446)
PMV BLD AUTO: 11.9 FL (ref 9–12.9)
POTASSIUM SERPL-SCNC: 4.5 MMOL/L (ref 3.6–5.5)
PROT SERPL-MCNC: 7 G/DL (ref 6–8.2)
RBC # BLD AUTO: 5.04 M/UL (ref 4.2–5.4)
SODIUM SERPL-SCNC: 139 MMOL/L (ref 135–145)
TRIGL SERPL-MCNC: 234 MG/DL (ref 0–149)
TSH SERPL DL<=0.005 MIU/L-ACNC: 2.96 UIU/ML (ref 0.38–5.33)
WBC # BLD AUTO: 6.3 K/UL (ref 4.8–10.8)

## 2021-10-25 PROCEDURE — 84443 ASSAY THYROID STIM HORMONE: CPT

## 2021-10-25 PROCEDURE — 80061 LIPID PANEL: CPT

## 2021-10-25 PROCEDURE — 36415 COLL VENOUS BLD VENIPUNCTURE: CPT

## 2021-10-25 PROCEDURE — 80053 COMPREHEN METABOLIC PANEL: CPT

## 2021-10-25 PROCEDURE — 99214 OFFICE O/P EST MOD 30 MIN: CPT | Performed by: NURSE PRACTITIONER

## 2021-10-25 PROCEDURE — 85025 COMPLETE CBC W/AUTO DIFF WBC: CPT

## 2021-10-25 RX ORDER — TRIAMCINOLONE ACETONIDE 1 MG/G
1 CREAM TOPICAL 2 TIMES DAILY
Qty: 15 G | Refills: 0 | Status: SHIPPED | OUTPATIENT
Start: 2021-10-25 | End: 2023-06-22

## 2021-10-25 RX ORDER — SULFAMETHOXAZOLE AND TRIMETHOPRIM 800; 160 MG/1; MG/1
1 TABLET ORAL 2 TIMES DAILY
Qty: 10 TABLET | Refills: 0 | Status: SHIPPED | OUTPATIENT
Start: 2021-10-25 | End: 2021-10-30

## 2021-10-25 NOTE — PROGRESS NOTES
Subjective:     Chief Complaint   Patient presents with   • Bug Bite     Spider Bites   • Requesting Labs         HPI:   Kim presents today with     Problem   Spider Bite Allergy, Current Reaction    This is a new issue.  Patient states that a few days ago she put on a pair of pants that had been in her closet for quite some time and states it was a spider inside of the pants that she did not notice the spider bit her approximately 6 times along her panty line on the right side.  Patient states that this has been significantly itchy and she has been using calamine and over-the-counter hydrocortisone cream to the reduce the itching.  She states that she has also noticed that the most anterior bite has become firm, hot to touch.  She states that she has not noticed anything specific that makes it better or worse aside from the calamine to reduce itching.  States that she and her  have had someone come to sprayed for bugs and she is hopeful that she has remedied this issue in the future.  Patient did have a similar bite on her breast a couple of months ago which healed without incident.     Hypothyroidism Due to Acquired Atrophy of Thyroid    Chronic in nature.  Overall stable.  Patient has not had any symptoms of increasing fatigue, dizziness, constipation or diarrhea, palpitations.  Patient will continue current dose of medication and update labs.     Mixed Hyperlipidemia    Chronic in nature.  Stable.  Patient continues to take rosuvastatin 10 mg.  Patient denies any muscle pain and states that medication has been working well.     Annual Physical Exam (Resolved)   Skin Lesion of Face (Resolved)       Current Outpatient Medications Ordered in Epic   Medication Sig Dispense Refill   • sulfamethoxazole-trimethoprim (BACTRIM DS) 800-160 MG tablet Take 1 Tablet by mouth 2 times a day for 5 days. 10 Tablet 0   • triamcinolone acetonide (KENALOG) 0.1 % Cream Apply 1 Application topically 2 times a day. 15 g 0  "  • methocarbamol (ROBAXIN) 500 MG Tab Take 1-2 Tablets by mouth 4 times a day as needed. 90 Tablet 2   • rosuvastatin (CRESTOR) 10 MG Tab TAKE ONE TABLET BY MOUTH EVERY EVENING (Patient taking differently: 10 mg every 48 hours.) 90 tablet 0   • levothyroxine (SYNTHROID) 50 MCG Tab TAKE ONE TABLET BY MOUTH EVERY MORNING ON AN EMPTY STOMACH 90 tablet 0     No current Epic-ordered facility-administered medications on file.       Health Maintenance: Completed    ROS:  Gen: no fevers/chills, no changes in weight  Eyes: no changes in vision  ENT: no sore throat, no hearing loss, no bloody nose  Pulm: no sob, no cough  CV: no chest pain, no palpitations  GI: no nausea/vomiting, no diarrhea  : no dysuria  MSk: no myalgias  Skin: no rash  Neuro: no headaches, no numbness/tingling  Heme/Lymph: no easy bruising      Objective:     Exam:  /78 (BP Location: Left arm, Patient Position: Sitting, BP Cuff Size: Large adult)   Pulse 76   Temp 36.8 °C (98.3 °F) (Temporal)   Resp 20   Ht 1.651 m (5' 5\")   Wt 86.7 kg (191 lb 3.2 oz)   SpO2 95%   BMI 31.82 kg/m²  Body mass index is 31.82 kg/m².    Gen: Alert and oriented, No apparent distress.  Neck: Neck is supple without lymphadenopathy.  Lungs: Normal effort, CTA bilaterally, no wheezes, rhonchi, or rales  CV: Regular rate and rhythm. No murmurs, rubs, or gallops.  Ext: No clubbing, cyanosis, edema.  Skin: 2 cm round red raised bump noted at lower panty line anterior hip, 4 0.5 to 1 cm round raised red areas noted along panty line inferior to patient's glutes.  2 cm raised lesion is noted to be hot to touch, firm, other 4 lesions are cool to touch, with no indication of scratching or scabbing.  Assessment & Plan:     66 y.o. female with the following -     Problem List Items Addressed This Visit     Anxiety    Hypothyroidism due to acquired atrophy of thyroid     -Continue levothyroxine 50 mcg a.m. on empty stomach  -Update labs         Relevant Orders    CBC WITH " DIFFERENTIAL    Comp Metabolic Panel    TSH    Mixed hyperlipidemia     -Labs ordered for update  -Continue rosuvastatin 10 mg every 48 hours.           Relevant Orders    CBC WITH DIFFERENTIAL    Comp Metabolic Panel    Lipid Profile    Obesity (BMI 30-39.9)    Relevant Orders    Patient identified as having weight management issue.  Appropriate orders and counseling given.    Lipid Profile    Spider bite allergy, current reaction     -Bactrim 800/160 p.o. twice daily x5 days provided for skin infection  -kenalog cream provided to use 1-2x daily for itching.   -Counseled patient regarding keeping bites clean and dry  -Discussed signs and symptoms of infection including increasing heat, growing redness, streaking, fever  -Discussed that she can send pictures via [employer] as needed         Relevant Medications    triamcinolone acetonide (KENALOG) 0.1 % Cream      Other Visit Diagnoses     Encounter for screening mammogram for malignant neoplasm of breast        Relevant Orders    MA-SCREENING MAMMO BILAT W/TOMOSYNTHESIS W/CAD    Post-menopausal        Relevant Orders    DS-BONE DENSITY STUDY (DEXA)    Cellulitis of right lower extremity        Relevant Medications    sulfamethoxazole-trimethoprim (BACTRIM DS) 800-160 MG tablet                Return in about 3 months (around 1/25/2022) for AWV.    Please note that this dictation was created using voice recognition software. I have made every reasonable attempt to correct obvious errors, but I expect that there are errors of grammar and possibly content that I did not discover before finalizing the note.

## 2021-10-25 NOTE — ASSESSMENT & PLAN NOTE
-Bactrim 800/160 p.o. twice daily x5 days provided for skin infection  -kenalog cream provided to use 1-2x daily for itching.   -Counseled patient regarding keeping bites clean and dry  -Discussed signs and symptoms of infection including increasing heat, growing redness, streaking, fever  -Discussed that she can send pictures via [employer] as needed

## 2021-11-04 RX ORDER — LEVOTHYROXINE SODIUM 0.05 MG/1
TABLET ORAL
Qty: 90 TABLET | Refills: 0 | Status: SHIPPED | OUTPATIENT
Start: 2021-11-04 | End: 2022-01-31

## 2021-12-23 ENCOUNTER — PATIENT MESSAGE (OUTPATIENT)
Dept: MEDICAL GROUP | Facility: PHYSICIAN GROUP | Age: 66
End: 2021-12-23

## 2021-12-23 DIAGNOSIS — R05.9 COUGH: ICD-10-CM

## 2021-12-30 RX ORDER — BENZONATATE 100 MG/1
100 CAPSULE ORAL 3 TIMES DAILY PRN
Qty: 60 CAPSULE | Refills: 0 | Status: SHIPPED | OUTPATIENT
Start: 2021-12-30 | End: 2023-04-11 | Stop reason: SDUPTHER

## 2022-03-18 ENCOUNTER — PATIENT MESSAGE (OUTPATIENT)
Dept: HEALTH INFORMATION MANAGEMENT | Facility: OTHER | Age: 67
End: 2022-03-18

## 2022-03-18 ENCOUNTER — TELEPHONE (OUTPATIENT)
Dept: HEALTH INFORMATION MANAGEMENT | Facility: OTHER | Age: 67
End: 2022-03-18
Payer: MEDICARE

## 2022-03-18 NOTE — TELEPHONE ENCOUNTER
Called PT to schedule a COMPREHENSIVE HEALTH ASSESSMENT. LVM stating I was calling regarding a Preventive Screening.

## 2022-05-26 ENCOUNTER — HOSPITAL ENCOUNTER (OUTPATIENT)
Dept: LAB | Facility: MEDICAL CENTER | Age: 67
End: 2022-05-26
Attending: NURSE PRACTITIONER
Payer: MEDICARE

## 2022-05-26 ENCOUNTER — OFFICE VISIT (OUTPATIENT)
Dept: MEDICAL GROUP | Facility: PHYSICIAN GROUP | Age: 67
End: 2022-05-26
Payer: MEDICARE

## 2022-05-26 VITALS
TEMPERATURE: 97.7 F | SYSTOLIC BLOOD PRESSURE: 116 MMHG | HEART RATE: 82 BPM | DIASTOLIC BLOOD PRESSURE: 78 MMHG | BODY MASS INDEX: 31.32 KG/M2 | HEIGHT: 65 IN | OXYGEN SATURATION: 94 % | WEIGHT: 188 LBS

## 2022-05-26 DIAGNOSIS — Z85.820 HISTORY OF MALIGNANT MELANOMA OF SKIN: ICD-10-CM

## 2022-05-26 DIAGNOSIS — E78.2 MIXED HYPERLIPIDEMIA: ICD-10-CM

## 2022-05-26 DIAGNOSIS — R53.83 FATIGUE, UNSPECIFIED TYPE: ICD-10-CM

## 2022-05-26 DIAGNOSIS — F33.1 MODERATE EPISODE OF RECURRENT MAJOR DEPRESSIVE DISORDER (HCC): ICD-10-CM

## 2022-05-26 DIAGNOSIS — E66.9 OBESITY (BMI 30-39.9): ICD-10-CM

## 2022-05-26 DIAGNOSIS — Z12.31 ENCOUNTER FOR SCREENING MAMMOGRAM FOR MALIGNANT NEOPLASM OF BREAST: ICD-10-CM

## 2022-05-26 DIAGNOSIS — E03.4 HYPOTHYROIDISM DUE TO ACQUIRED ATROPHY OF THYROID: ICD-10-CM

## 2022-05-26 DIAGNOSIS — Z00.00 MEDICARE ANNUAL WELLNESS VISIT, SUBSEQUENT: Primary | ICD-10-CM

## 2022-05-26 PROBLEM — Z12.4 CERVICAL CANCER SCREENING: Status: RESOLVED | Noted: 2021-05-27 | Resolved: 2022-05-26

## 2022-05-26 PROBLEM — T63.301A SPIDER BITE ALLERGY, CURRENT REACTION: Status: RESOLVED | Noted: 2021-10-25 | Resolved: 2022-05-26

## 2022-05-26 PROBLEM — M79.18 RHOMBOID MUSCLE PAIN: Status: RESOLVED | Noted: 2021-09-23 | Resolved: 2022-05-26

## 2022-05-26 LAB
ALBUMIN SERPL BCP-MCNC: 4.8 G/DL (ref 3.2–4.9)
ALBUMIN/GLOB SERPL: 1.9 G/DL
ALP SERPL-CCNC: 50 U/L (ref 30–99)
ALT SERPL-CCNC: 33 U/L (ref 2–50)
ANION GAP SERPL CALC-SCNC: 15 MMOL/L (ref 7–16)
AST SERPL-CCNC: 27 U/L (ref 12–45)
BASOPHILS # BLD AUTO: 0.4 % (ref 0–1.8)
BASOPHILS # BLD: 0.03 K/UL (ref 0–0.12)
BILIRUB SERPL-MCNC: 1.7 MG/DL (ref 0.1–1.5)
BUN SERPL-MCNC: 12 MG/DL (ref 8–22)
CALCIUM SERPL-MCNC: 9.4 MG/DL (ref 8.5–10.5)
CHLORIDE SERPL-SCNC: 104 MMOL/L (ref 96–112)
CHOLEST SERPL-MCNC: 211 MG/DL (ref 100–199)
CO2 SERPL-SCNC: 20 MMOL/L (ref 20–33)
CREAT SERPL-MCNC: 0.86 MG/DL (ref 0.5–1.4)
EOSINOPHIL # BLD AUTO: 0.15 K/UL (ref 0–0.51)
EOSINOPHIL NFR BLD: 2.2 % (ref 0–6.9)
ERYTHROCYTE [DISTWIDTH] IN BLOOD BY AUTOMATED COUNT: 46.5 FL (ref 35.9–50)
FASTING STATUS PATIENT QL REPORTED: NORMAL
GFR SERPLBLD CREATININE-BSD FMLA CKD-EPI: 73 ML/MIN/1.73 M 2
GLOBULIN SER CALC-MCNC: 2.5 G/DL (ref 1.9–3.5)
GLUCOSE SERPL-MCNC: 97 MG/DL (ref 65–99)
HCT VFR BLD AUTO: 48.8 % (ref 37–47)
HDLC SERPL-MCNC: 56 MG/DL
HGB BLD-MCNC: 16 G/DL (ref 12–16)
IMM GRANULOCYTES # BLD AUTO: 0.02 K/UL (ref 0–0.11)
IMM GRANULOCYTES NFR BLD AUTO: 0.3 % (ref 0–0.9)
LDLC SERPL CALC-MCNC: 115 MG/DL
LYMPHOCYTES # BLD AUTO: 2.79 K/UL (ref 1–4.8)
LYMPHOCYTES NFR BLD: 41.1 % (ref 22–41)
MCH RBC QN AUTO: 30.2 PG (ref 27–33)
MCHC RBC AUTO-ENTMCNC: 32.8 G/DL (ref 33.6–35)
MCV RBC AUTO: 92.1 FL (ref 81.4–97.8)
MONOCYTES # BLD AUTO: 0.34 K/UL (ref 0–0.85)
MONOCYTES NFR BLD AUTO: 5 % (ref 0–13.4)
NEUTROPHILS # BLD AUTO: 3.46 K/UL (ref 2–7.15)
NEUTROPHILS NFR BLD: 51 % (ref 44–72)
NRBC # BLD AUTO: 0 K/UL
NRBC BLD-RTO: 0 /100 WBC
PLATELET # BLD AUTO: 183 K/UL (ref 164–446)
PMV BLD AUTO: 12.3 FL (ref 9–12.9)
POTASSIUM SERPL-SCNC: 4.3 MMOL/L (ref 3.6–5.5)
PROT SERPL-MCNC: 7.3 G/DL (ref 6–8.2)
RBC # BLD AUTO: 5.3 M/UL (ref 4.2–5.4)
SODIUM SERPL-SCNC: 139 MMOL/L (ref 135–145)
TRIGL SERPL-MCNC: 202 MG/DL (ref 0–149)
TSH SERPL DL<=0.005 MIU/L-ACNC: 2.29 UIU/ML (ref 0.38–5.33)
VIT B12 SERPL-MCNC: 654 PG/ML (ref 211–911)
WBC # BLD AUTO: 6.8 K/UL (ref 4.8–10.8)

## 2022-05-26 PROCEDURE — 82607 VITAMIN B-12: CPT

## 2022-05-26 PROCEDURE — 80053 COMPREHEN METABOLIC PANEL: CPT

## 2022-05-26 PROCEDURE — G0439 PPPS, SUBSEQ VISIT: HCPCS | Performed by: NURSE PRACTITIONER

## 2022-05-26 PROCEDURE — 80061 LIPID PANEL: CPT

## 2022-05-26 PROCEDURE — 85025 COMPLETE CBC W/AUTO DIFF WBC: CPT

## 2022-05-26 PROCEDURE — 36415 COLL VENOUS BLD VENIPUNCTURE: CPT

## 2022-05-26 PROCEDURE — 82306 VITAMIN D 25 HYDROXY: CPT

## 2022-05-26 PROCEDURE — 84443 ASSAY THYROID STIM HORMONE: CPT

## 2022-05-26 ASSESSMENT — ACTIVITIES OF DAILY LIVING (ADL): BATHING_REQUIRES_ASSISTANCE: 0

## 2022-05-26 ASSESSMENT — PATIENT HEALTH QUESTIONNAIRE - PHQ9
5. POOR APPETITE OR OVEREATING: 1 - SEVERAL DAYS
SUM OF ALL RESPONSES TO PHQ QUESTIONS 1-9: 6
CLINICAL INTERPRETATION OF PHQ2 SCORE: 2

## 2022-05-26 ASSESSMENT — FIBROSIS 4 INDEX: FIB4 SCORE: 1.16

## 2022-05-26 ASSESSMENT — ENCOUNTER SYMPTOMS: GENERAL WELL-BEING: EXCELLENT

## 2022-05-26 NOTE — PROGRESS NOTES
Chief Complaint   Patient presents with   • Medicare Annual Wellness       HPI:  Kim is a 66 y.o. here for Medicare Annual Wellness Visit    Problem   Major Depressive Disorder    Positive depression screen today.  Patient states that she thinks that this is primarily related to increased fatigue recently.    Depression Screening    Little interest or pleasure in doing things?  1 - several days   Feeling down, depressed , or hopeless? 1 - several days   Trouble falling or staying asleep, or sleeping too much?  0 - not at all   Feeling tired or having little energy?  2 - more than half the days   Poor appetite or overeating?  1 - several days   Feeling bad about yourself - or that you are a failure or have let yourself or your family down? 1 - several days   Trouble concentrating on things, such as reading the newspaper or watching television? 0 - not at all   Moving or speaking so slowly that other people could have noticed.  Or the opposite - being so fidgety or restless that you have been moving around a lot more than usual?  0 - not at all   Thoughts that you would be better off dead, or of hurting yourself?  0 - not at all   Patient Health Questionnaire Score: 6       If depressive symptoms identified deferred to follow up visit unless specifically addressed in assesment and plan.    Interpretation of PHQ-9 Total Score   Score Severity   1-4 No Depression   5-9 Mild Depression   10-14 Moderate Depression   15-19 Moderately Severe Depression   20-27 Severe Depression         History of Malignant Melanoma of Skin    Chronic in nature.  Patient following up with dermatology yearly.  States she will schedule follow-up.  States she has not seen any areas of skin change.     Obesity (Bmi 30-39.9)    Chronic in nature.  Stable.  Patient has been working on healthy diet and exercise.  We discussed this briefly.     Hypothyroidism Due to Acquired Atrophy of Thyroid    Chronic in nature.  Most recent labs indicated  overall stability but patient states that she has been feeling increasingly fatigued, states that she has been having difficulty with the increased fatigue.  States that she is concerned that her thyroid may be off and is requesting updated labs.     Mixed Hyperlipidemia    Chronic in nature.  Stable.  Patient continues to take rosuvastatin 10 mg.  Patient denies any muscle pain and states that medication has been working well.     Spider Bite Allergy, Current Reaction (Resolved)    This is a new issue.  Patient states that a few days ago she put on a pair of pants that had been in her closet for quite some time and states it was a spider inside of the pants that she did not notice the spider bit her approximately 6 times along her panty line on the right side.  Patient states that this has been significantly itchy and she has been using calamine and over-the-counter hydrocortisone cream to the reduce the itching.  She states that she has also noticed that the most anterior bite has become firm, hot to touch.  She states that she has not noticed anything specific that makes it better or worse aside from the calamine to reduce itching.  States that she and her  have had someone come to sprayed for bugs and she is hopeful that she has remedied this issue in the future.  Patient did have a similar bite on her breast a couple of months ago which healed without incident.     Rhomboid Muscle Pain (Resolved)   Cervical Cancer Screening (Resolved)         Patient Active Problem List    Diagnosis Date Noted   • Major depressive disorder 05/06/2020   • History of malignant melanoma of skin 03/30/2018   • Obesity (BMI 30-39.9) 03/08/2018   • Hypothyroidism due to acquired atrophy of thyroid 03/08/2018   • Mixed hyperlipidemia 03/08/2018   • Anxiety 03/08/2018       Current Outpatient Medications   Medication Sig Dispense Refill   • rosuvastatin (CRESTOR) 10 MG Tab TAKE ONE TABLET BY MOUTH EVERY EVENING 90 Tablet 3   •  levothyroxine (SYNTHROID) 50 MCG Tab TAKE 1 TABLET BY MOUTH EVERY MORNING ON AN EMPTY STOMACH 90 Tablet 3   • benzonatate (TESSALON) 100 MG Cap Take 1 Capsule by mouth 3 times a day as needed for Cough. 60 Capsule 0   • triamcinolone acetonide (KENALOG) 0.1 % Cream Apply 1 Application topically 2 times a day. 15 g 0   • methocarbamol (ROBAXIN) 500 MG Tab Take 1-2 Tablets by mouth 4 times a day as needed. 90 Tablet 2     No current facility-administered medications for this visit.        Patient is taking medications as noted in medication list.  Current supplements as per medication list.     Allergies: Grass pollen(k-o-r-t-swt terri)    Current social contact/activities: pt still working     Is patient current with immunizations? Yes.    She  reports that she has never smoked. She has never used smokeless tobacco. She reports current alcohol use of about 1.8 oz of alcohol per week. She reports previous drug use. Drug: Marijuana.  Counseling given: Not Answered      DPA/Advanced directive: Patient has Advanced Directive, but it is not on file. Instructed to bring in a copy to scan into their chart.    ROS:    Gait: Uses no assistive device   Ostomy: No   Other tubes: No   Amputations: No   Chronic oxygen use No   Last eye exam 3 months ago   Wears hearing aids: No   : Reports urinary leakage during the last 6 months that has not interfered at all with their daily activities or sleep.      Screening:    Depression Screening  Little interest or pleasure in doing things?  1 - several days  Feeling down, depressed, or hopeless? 1 - several days  Trouble falling or staying asleep, or sleeping too much?  0 - not at all  Feeling tired or having little energy?  2 - more than half the days  Poor appetite or overeating?  1 - several days  Feeling bad about yourself - or that you are a failure or have let yourself or your family down? 1 - several days  Trouble concentrating on things, such as reading the newspaper or watching  television? 0 - not at all  Moving or speaking so slowly that other people could have noticed.  Or the opposite - being so fidgety or restless that you have been moving around a lot more than usual?  0 - not at all  Thoughts that you would be better off dead, or of hurting yourself?  0 - not at all  Patient Health Questionnaire Score: 6    If depressive symptoms identified deferred to follow up visit unless specifically addressed in assessment and plan.    Interpretation of PHQ-9 Total Score   Score Severity   1-4 No Depression   5-9 Mild Depression   10-14 Moderate Depression   15-19 Moderately Severe Depression   20-27 Severe Depression      Screening for Cognitive Impairment  Three Minute Recall (daughter, heaven, mike)  3/3    Draw clock face with all 12 numbers and set the hands to show 10 past 11.  Yes Wrong time  If cognitive concerns identified, deferred for follow up unless specifically addressed in assessment and plan.    Fall Risk Assessment  Has the patient had two or more falls in the last year or any fall with injury in the last year?  No  If fall risk identified, deferred for follow up unless specifically addressed in assessment and plan.    Safety Assessment  Throw rugs on floor.  No  Handrails on all stairs.  Yes  Good lighting in all hallways.  Yes  Difficulty hearing.  No  Patient counseled about all safety risks that were identified.    Functional Assessment ADLs  Are there any barriers preventing you from cooking for yourself or meeting nutritional needs?  No.    Are there any barriers preventing you from driving safely or obtaining transportation?  No.    Are there any barriers preventing you from using a telephone or calling for help?  No.    Are there any barriers preventing you from shopping?  No.    Are there any barriers preventing you from taking care of your own finances?  No.    Are there any barriers preventing you from managing your medications?    No.    Are there any barriers  preventing you from showering, bathing or dressing yourself?  No.    Are you currently engaging in any exercise or physical activity?  Yes.  Walking, pilates, hiking  What is your perception of your health?  Excellent.    Health Maintenance Summary          Overdue - Annual Wellness Visit (Once) Overdue - never done    No completion history exists for this topic.          Scheduled - MAMMOGRAM (Yearly) Scheduled for 6/24/2022    No completion history exists for this topic.          Overdue - IMM ZOSTER VACCINES (1 of 2) Overdue - never done    No completion history exists for this topic.          Ordered - BONE DENSITY (Every 5 Years) Ordered on 10/25/2021    No completion history exists for this topic.          Postponed - IMM INFLUENZA (Season Ended) Postponed until 10/25/2022    No completion history exists for this topic.          Postponed - IMM DTaP/Tdap/Td Vaccine (1 - Tdap) Postponed until 10/25/2022    No completion history exists for this topic.          Postponed - IMM PNEUMOCOCCAL VACCINE: 65+ Years (1 - PCV) Postponed until 10/25/2022    No completion history exists for this topic.          PAP SMEAR (Every 3 Years) Next due on 5/27/2024 05/27/2021  THINPREP PAP WITH HPV    05/27/2021  Pathology Gynecology Specimen          COLORECTAL CANCER SCREENING (COLONOSCOPY - Every 10 Years) Next due on 6/9/2025 06/09/2015  REFERRAL TO GI FOR COLONOSCOPY          COVID-19 Vaccine (Series Information) Completed    11/17/2021  Imm Admin: Moderna SARS-CoV-2 Vaccine    04/02/2021  Imm Admin: Moderna SARS-CoV-2 Vaccine    03/05/2021  Imm Admin: Moderna SARS-CoV-2 Vaccine          IMM HEP B VACCINE (Series Information) Aged Out    No completion history exists for this topic.          IMM MENINGOCOCCAL VACCINE (MCV4) (Series Information) Aged Out    No completion history exists for this topic.          Discontinued - HEPATITIS C SCREENING  Discontinued    No completion history exists for this topic.           "      Patient Care Team:  AMADOR Valerio as PCP - General (Family Medicine)  AMADOR Valerio as PCP - [facility] Paneled    Social History     Tobacco Use   • Smoking status: Never Smoker   • Smokeless tobacco: Never Used   Vaping Use   • Vaping Use: Never used   Substance Use Topics   • Alcohol use: Yes     Alcohol/week: 1.8 oz     Types: 3 Standard drinks or equivalent per week     Comment: occasional   • Drug use: Not Currently     Types: Marijuana     Family History   Problem Relation Age of Onset   • Heart Disease Mother         CHF   • Thyroid Mother    • Seizures Mother    • Cancer Father         skin   • Heart Disease Father 54        MI     She  has a past medical history of History of malignant melanoma of skin (3/30/2018), Hyperlipidemia, and Hypothyroidism.    She has no past medical history of Addisons disease (HCC), Adrenal disorder (HCC), Allergy, Anemia, Arrhythmia, Arthritis, Asthma, Blood transfusion without reported diagnosis, Cancer (HCC), Cataract, CHF (congestive heart failure) (HCC), Clotting disorder (HCC), COPD (chronic obstructive pulmonary disease) (HCC), Cushings syndrome (HCC), Diabetes (HCC), Diabetic neuropathy (HCC), GERD (gastroesophageal reflux disease), Glaucoma, Goiter, Head ache, Heart attack (HCC), Heart murmur, HIV (human immunodeficiency virus infection) (HCC), Hypertension, IBD (inflammatory bowel disease), Kidney disease, Meningitis, Migraine, Muscle disorder, Osteoporosis, Parathyroid disorder (HCC), Pituitary disease (HCC), Pulmonary emphysema (HCC), Seizure (HCC), Sickle cell disease (HCC), Stroke (HCC), Substance abuse (HCC), Tuberculosis, or Urinary tract infection.   Past Surgical History:   Procedure Laterality Date   • BIOPSY GENERAL      skin         Exam:   /78 (BP Location: Left arm, Patient Position: Sitting, BP Cuff Size: Adult)   Pulse 82   Temp 36.5 °C (97.7 °F) (Temporal)   Ht 1.651 m (5' 5\")   Wt 85.3 kg (188 lb)  "  SpO2 94%  Body mass index is 31.28 kg/m².    Hearing good.    Dentition good  Alert, oriented in no acute distress  Eye contact is good, speech goal directed, affect calm      Assessment and Plan. The following treatment and monitoring plan is recommended:    Problem List Items Addressed This Visit     History of malignant melanoma of skin     - Follow-up with dermatology as recommended.           Hypothyroidism due to acquired atrophy of thyroid     - Labs are ordered for update  -TSH had increased on her previous labs so definitely likely will need to consider increasing her thyroid medication.           Major depressive disorder    Mixed hyperlipidemia     - Continue rosuvastatin  -Labs ordered for update.           Relevant Orders    CBC WITH DIFFERENTIAL    Comp Metabolic Panel    Lipid Profile    TSH WITH REFLEX TO FT4    Obesity (BMI 30-39.9)     - Continue work on healthy diet and exercise.             Other Visit Diagnoses     Fatigue, unspecified type        Relevant Orders    VITAMIN B12    VITAMIN D,25 HYDROXY    Encounter for screening mammogram for malignant neoplasm of breast        Relevant Orders    MA-SCREENING MAMMO BILAT W/TOMOSYNTHESIS W/CAD               Services suggested: No services needed at this time  Health Care Screening recommendations as per orders if indicated.  Referrals offered: PT/OT/Nutrition counseling/Behavioral Health/Smoking cessation as per orders if indicated.    Discussion today about general wellness and lifestyle habits:    · Prevent falls and reduce trip hazards; Cautioned about securing or removing rugs.  · Have a working fire alarm and carbon monoxide detector;   · Engage in regular physical activity and social activities.     Follow-up: Return in about 1 month (around 6/26/2022), or if symptoms worsen or fail to improve.

## 2022-05-27 NOTE — ASSESSMENT & PLAN NOTE
- Labs are ordered for update  -TSH had increased on her previous labs so definitely likely will need to consider increasing her thyroid medication.

## 2022-05-29 LAB — 25(OH)D3 SERPL-MCNC: 31 NG/ML (ref 30–80)

## 2022-06-24 ENCOUNTER — HOSPITAL ENCOUNTER (OUTPATIENT)
Dept: RADIOLOGY | Facility: MEDICAL CENTER | Age: 67
End: 2022-06-24
Attending: NURSE PRACTITIONER
Payer: MEDICARE

## 2022-06-24 DIAGNOSIS — Z12.31 ENCOUNTER FOR SCREENING MAMMOGRAM FOR MALIGNANT NEOPLASM OF BREAST: ICD-10-CM

## 2022-06-24 PROCEDURE — 77063 BREAST TOMOSYNTHESIS BI: CPT

## 2022-10-31 ENCOUNTER — APPOINTMENT (OUTPATIENT)
Dept: MEDICAL GROUP | Facility: PHYSICIAN GROUP | Age: 67
End: 2022-10-31
Payer: MEDICARE

## 2022-11-11 ENCOUNTER — DOCUMENTATION (OUTPATIENT)
Dept: HEALTH INFORMATION MANAGEMENT | Facility: OTHER | Age: 67
End: 2022-11-11
Payer: MEDICARE

## 2022-11-11 ENCOUNTER — PATIENT MESSAGE (OUTPATIENT)
Dept: HEALTH INFORMATION MANAGEMENT | Facility: OTHER | Age: 67
End: 2022-11-11

## 2022-12-08 NOTE — TELEPHONE ENCOUNTER
Pt is here for right ear pain for the past 1 wk. Mom states pt was dx with flu about 1wk ago. No fever. No vomiting/diarrhea     Received request via: Pharmacy    Was the patient seen in the last year in this department? Yes    Does the patient have an active prescription (recently filled or refills available) for medication(s) requested? No

## 2023-04-11 ENCOUNTER — OFFICE VISIT (OUTPATIENT)
Dept: MEDICAL GROUP | Facility: PHYSICIAN GROUP | Age: 68
End: 2023-04-11
Payer: MEDICARE

## 2023-04-11 VITALS
DIASTOLIC BLOOD PRESSURE: 60 MMHG | TEMPERATURE: 98.1 F | SYSTOLIC BLOOD PRESSURE: 124 MMHG | WEIGHT: 192.6 LBS | BODY MASS INDEX: 32.09 KG/M2 | HEIGHT: 65 IN | HEART RATE: 67 BPM | OXYGEN SATURATION: 97 %

## 2023-04-11 DIAGNOSIS — J30.89 ENVIRONMENTAL AND SEASONAL ALLERGIES: ICD-10-CM

## 2023-04-11 DIAGNOSIS — E03.4 HYPOTHYROIDISM DUE TO ACQUIRED ATROPHY OF THYROID: ICD-10-CM

## 2023-04-11 DIAGNOSIS — E78.2 MIXED HYPERLIPIDEMIA: ICD-10-CM

## 2023-04-11 DIAGNOSIS — R05.9 COUGH: ICD-10-CM

## 2023-04-11 PROCEDURE — 99214 OFFICE O/P EST MOD 30 MIN: CPT | Performed by: NURSE PRACTITIONER

## 2023-04-11 RX ORDER — BENZONATATE 100 MG/1
100 CAPSULE ORAL 3 TIMES DAILY PRN
Qty: 60 CAPSULE | Refills: 3 | Status: SHIPPED | OUTPATIENT
Start: 2023-04-11

## 2023-04-11 RX ORDER — LEVOTHYROXINE SODIUM 0.07 MG/1
75 TABLET ORAL
Qty: 90 TABLET | Refills: 0 | Status: SHIPPED | OUTPATIENT
Start: 2023-04-11 | End: 2023-08-10

## 2023-04-11 RX ORDER — ROSUVASTATIN CALCIUM 10 MG/1
10 TABLET, COATED ORAL EVERY EVENING
Qty: 90 TABLET | Refills: 3 | Status: SHIPPED | OUTPATIENT
Start: 2023-04-11

## 2023-04-11 ASSESSMENT — ENCOUNTER SYMPTOMS
SHORTNESS OF BREATH: 0
DIARRHEA: 0
VOMITING: 0
HEADACHES: 0
ABDOMINAL PAIN: 0
COUGH: 1
NAUSEA: 0
FEVER: 0
BLURRED VISION: 0
DIZZINESS: 0
PALPITATIONS: 0

## 2023-04-11 ASSESSMENT — PATIENT HEALTH QUESTIONNAIRE - PHQ9: CLINICAL INTERPRETATION OF PHQ2 SCORE: 0

## 2023-04-11 ASSESSMENT — FIBROSIS 4 INDEX: FIB4 SCORE: 1.77

## 2023-04-11 NOTE — PROGRESS NOTES
Subjective:     Chief Complaint   Patient presents with    Medication Management     Thyroid medication       HPI:   Kim presents today with     Problem   Environmental and Seasonal Allergies    Chronic in nature. Reports a bothersome cough that is seasonal in occurrence. She has taken tessalon 100 mg PRN in the past with good results. Denies any recent illness, no fevers. Denies pleuritic pain, no shortness of breath or difficulty breathing.      Hypothyroidism Due to Acquired Atrophy of Thyroid    Chronic in nature.  Most recent labs indicated overall stability but patient states that she has been feeling increasingly fatigued, states that she has been having difficulty with the increased fatigue. Reports weight gain of 10 pounds in the past 1 year. Last TSH from May 2022 was 2.290 and within normal limits. Currently takes levothyroxine 50 mcg daily on an empty stomach. She states that she stays active and denies any current stress. No problems with sleep. Diet consists of some increased carbohydrates.      Mixed Hyperlipidemia    Chronic in nature.  Stable.  Patient continues to take rosuvastatin 10 mg daily.  Patient denies any muscle pain and states that medication has been working well. Last lipid panel 5/26/2022, will obtain updated labs.          Current Outpatient Medications Ordered in Epic   Medication Sig Dispense Refill    benzonatate (TESSALON) 100 MG Cap Take 1 Capsule by mouth 3 times a day as needed for Cough. 60 Capsule 3    rosuvastatin (CRESTOR) 10 MG Tab Take 1 Tablet by mouth every evening. 90 Tablet 3    levothyroxine (SYNTHROID) 75 MCG Tab Take 1 Tablet by mouth every morning on an empty stomach. 90 Tablet 0    levothyroxine (SYNTHROID) 50 MCG Tab TAKE 1 TABLET BY MOUTH EVERY MORNING ON AN EMPTY STOMACH 90 Tablet 0    triamcinolone acetonide (KENALOG) 0.1 % Cream Apply 1 Application topically 2 times a day. (Patient not taking: Reported on 4/11/2023) 15 g 0    methocarbamol (ROBAXIN) 500  "MG Tab Take 1-2 Tablets by mouth 4 times a day as needed. 90 Tablet 2     No current Epic-ordered facility-administered medications on file.       Health Maintenance: Refused zoster and pneumonia vaccines. Declined DEX scan     Review of Systems   Constitutional:  Negative for fever.   Eyes:  Negative for blurred vision.   Respiratory:  Positive for cough (related to seasonal triggers). Negative for shortness of breath.    Cardiovascular:  Negative for chest pain, palpitations and leg swelling.   Gastrointestinal:  Negative for abdominal pain, diarrhea, nausea and vomiting.   Neurological:  Negative for dizziness and headaches.   Endo/Heme/Allergies:  Positive for environmental allergies.       Objective:     Exam:  /60 (BP Location: Left arm, Patient Position: Sitting, BP Cuff Size: Adult)   Pulse 67   Temp 36.7 °C (98.1 °F) (Temporal)   Ht 1.651 m (5' 5\")   Wt 87.4 kg (192 lb 9.6 oz)   SpO2 97%   BMI 32.05 kg/m²  Body mass index is 32.05 kg/m².    Physical Exam  Constitutional:       Appearance: Normal appearance.   HENT:      Mouth/Throat:      Mouth: Mucous membranes are moist.      Pharynx: Oropharynx is clear. No oropharyngeal exudate or posterior oropharyngeal erythema.   Eyes:      Extraocular Movements: Extraocular movements intact.      Conjunctiva/sclera: Conjunctivae normal.      Pupils: Pupils are equal, round, and reactive to light.   Cardiovascular:      Rate and Rhythm: Normal rate and regular rhythm.      Pulses: Normal pulses.      Heart sounds: Normal heart sounds.   Pulmonary:      Effort: Pulmonary effort is normal.      Breath sounds: Normal breath sounds.   Skin:     General: Skin is warm and dry.   Neurological:      Mental Status: She is alert and oriented to person, place, and time.   Psychiatric:         Mood and Affect: Mood normal.         Behavior: Behavior normal.         Thought Content: Thought content normal.     Assessment & Plan:     67 y.o. female with the following - "     Problem List Items Addressed This Visit       Environmental and seasonal allergies     - Refill benzonatate 100 mg TID PRN   - Recommended OTC oral allergy medication          Hypothyroidism due to acquired atrophy of thyroid     - Increase levothyroxine dose to 75 mcg daily, will reassess TSH level in 2 months    - TSH with reflex to FT4, Comp metabolic panel, lipid panel, and follow up in 2 months          Relevant Medications    levothyroxine (SYNTHROID) 75 MCG Tab    Other Relevant Orders    TSH WITH REFLEX TO FT4    Mixed hyperlipidemia     - Lipid panel   - Follow up in 2 months          Relevant Medications    rosuvastatin (CRESTOR) 10 MG Tab    Other Relevant Orders    Comp Metabolic Panel    Lipid Profile     Other Visit Diagnoses       Cough        Relevant Medications    benzonatate (TESSALON) 100 MG Cap            Return in about 2 months (around 6/11/2023) for Lab Review.      Please note that this dictation was created using voice recognition software. I have made every reasonable attempt to correct obvious errors, but I expect that there are errors of grammar and possibly content that I did not discover before finalizing the note.

## 2023-04-11 NOTE — ASSESSMENT & PLAN NOTE
- Increase levothyroxine dose to 75 mcg daily, will reassess TSH level in 2 months    - TSH with reflex to FT4, Comp metabolic panel, lipid panel, and follow up in 2 months

## 2023-05-17 ENCOUNTER — TELEPHONE (OUTPATIENT)
Dept: HEALTH INFORMATION MANAGEMENT | Facility: OTHER | Age: 68
End: 2023-05-17
Payer: MEDICARE

## 2023-06-14 ENCOUNTER — HOSPITAL ENCOUNTER (OUTPATIENT)
Dept: LAB | Facility: MEDICAL CENTER | Age: 68
End: 2023-06-14
Attending: NURSE PRACTITIONER
Payer: MEDICARE

## 2023-06-14 DIAGNOSIS — E03.4 HYPOTHYROIDISM DUE TO ACQUIRED ATROPHY OF THYROID: ICD-10-CM

## 2023-06-14 DIAGNOSIS — E78.2 MIXED HYPERLIPIDEMIA: ICD-10-CM

## 2023-06-14 LAB
T4 FREE SERPL-MCNC: 1.8 NG/DL (ref 0.93–1.7)
TSH SERPL DL<=0.005 MIU/L-ACNC: 0.29 UIU/ML (ref 0.38–5.33)

## 2023-06-14 PROCEDURE — 36415 COLL VENOUS BLD VENIPUNCTURE: CPT

## 2023-06-14 PROCEDURE — 80053 COMPREHEN METABOLIC PANEL: CPT

## 2023-06-14 PROCEDURE — 84443 ASSAY THYROID STIM HORMONE: CPT

## 2023-06-14 PROCEDURE — 84439 ASSAY OF FREE THYROXINE: CPT

## 2023-06-14 PROCEDURE — 80061 LIPID PANEL: CPT

## 2023-06-15 LAB
ALBUMIN SERPL BCP-MCNC: 4.6 G/DL (ref 3.2–4.9)
ALBUMIN/GLOB SERPL: 2 G/DL
ALP SERPL-CCNC: 46 U/L (ref 30–99)
ALT SERPL-CCNC: 36 U/L (ref 2–50)
ANION GAP SERPL CALC-SCNC: 11 MMOL/L (ref 7–16)
AST SERPL-CCNC: 26 U/L (ref 12–45)
BILIRUB SERPL-MCNC: 1.7 MG/DL (ref 0.1–1.5)
BUN SERPL-MCNC: 14 MG/DL (ref 8–22)
CALCIUM ALBUM COR SERPL-MCNC: 9.1 MG/DL (ref 8.5–10.5)
CALCIUM SERPL-MCNC: 9.6 MG/DL (ref 8.5–10.5)
CHLORIDE SERPL-SCNC: 103 MMOL/L (ref 96–112)
CHOLEST SERPL-MCNC: 161 MG/DL (ref 100–199)
CO2 SERPL-SCNC: 25 MMOL/L (ref 20–33)
CREAT SERPL-MCNC: 0.82 MG/DL (ref 0.5–1.4)
FASTING STATUS PATIENT QL REPORTED: NORMAL
GFR SERPLBLD CREATININE-BSD FMLA CKD-EPI: 77 ML/MIN/1.73 M 2
GLOBULIN SER CALC-MCNC: 2.3 G/DL (ref 1.9–3.5)
GLUCOSE SERPL-MCNC: 111 MG/DL (ref 65–99)
HDLC SERPL-MCNC: 57 MG/DL
LDLC SERPL CALC-MCNC: 84 MG/DL
POTASSIUM SERPL-SCNC: 4.6 MMOL/L (ref 3.6–5.5)
PROT SERPL-MCNC: 6.9 G/DL (ref 6–8.2)
SODIUM SERPL-SCNC: 139 MMOL/L (ref 135–145)
TRIGL SERPL-MCNC: 102 MG/DL (ref 0–149)

## 2023-06-20 ENCOUNTER — OFFICE VISIT (OUTPATIENT)
Dept: MEDICAL GROUP | Facility: PHYSICIAN GROUP | Age: 68
End: 2023-06-20
Payer: MEDICARE

## 2023-06-20 VITALS
HEART RATE: 72 BPM | DIASTOLIC BLOOD PRESSURE: 60 MMHG | TEMPERATURE: 98.7 F | OXYGEN SATURATION: 98 % | HEIGHT: 65 IN | WEIGHT: 192 LBS | SYSTOLIC BLOOD PRESSURE: 128 MMHG | BODY MASS INDEX: 31.99 KG/M2

## 2023-06-20 DIAGNOSIS — E03.4 HYPOTHYROIDISM DUE TO ACQUIRED ATROPHY OF THYROID: ICD-10-CM

## 2023-06-20 DIAGNOSIS — N39.46 MIXED STRESS AND URGE URINARY INCONTINENCE: ICD-10-CM

## 2023-06-20 DIAGNOSIS — E66.9 OBESITY (BMI 30-39.9): ICD-10-CM

## 2023-06-20 DIAGNOSIS — R73.09 ELEVATED GLUCOSE: ICD-10-CM

## 2023-06-20 DIAGNOSIS — E78.2 MIXED HYPERLIPIDEMIA: ICD-10-CM

## 2023-06-20 DIAGNOSIS — F33.1 MODERATE EPISODE OF RECURRENT MAJOR DEPRESSIVE DISORDER (HCC): ICD-10-CM

## 2023-06-20 LAB
HBA1C MFR BLD: 5.5 % (ref ?–5.8)
POCT INT CON NEG: NEGATIVE
POCT INT CON POS: POSITIVE

## 2023-06-20 PROCEDURE — 83036 HEMOGLOBIN GLYCOSYLATED A1C: CPT | Performed by: NURSE PRACTITIONER

## 2023-06-20 PROCEDURE — 3074F SYST BP LT 130 MM HG: CPT | Performed by: NURSE PRACTITIONER

## 2023-06-20 PROCEDURE — 99214 OFFICE O/P EST MOD 30 MIN: CPT | Performed by: NURSE PRACTITIONER

## 2023-06-20 PROCEDURE — 3078F DIAST BP <80 MM HG: CPT | Performed by: NURSE PRACTITIONER

## 2023-06-20 ASSESSMENT — ENCOUNTER SYMPTOMS
CHILLS: 0
DEPRESSION: 0
FEVER: 0
SHORTNESS OF BREATH: 0
COUGH: 0
DIZZINESS: 0
WHEEZING: 0
HEADACHES: 0
PALPITATIONS: 0

## 2023-06-20 ASSESSMENT — FIBROSIS 4 INDEX: FIB4 SCORE: 1.63

## 2023-06-20 NOTE — PROGRESS NOTES
"Subjective:     Chief Complaint   Patient presents with    Lab Results       HPI:   Kim presents today with     Problem   Mixed Stress and Urge Urinary Incontinence    Chronic in nature.  Patient states that she has been having more severe symptoms recently.  States that she wears pads every day all day as she does have frequency, urgency, leaking when she coughs or sneezes.  She states that she will often have to go to the bathroom suddenly and does believe that she may have prolapse as she has a lot of downward pressure in the vagina.  she states that previously she had had to have a surgery she was unable to have kids and that they tried to lift\" untangle her uterus and tubes\" laded to this we are concerned about prolapse, patient is refusing pelvic exam today.  As she would like to discuss potentially surgery.  States requesting referral to urology related to symptoms.     Major Depressive Disorder    Denies any symptoms of depression.  States that she is feeling very well related to job promotion.     Mixed Hyperlipidemia    That medication is working well.Chronic in nature.  Stable.  Patient continues to take rosuvastatin 10 mg daily.          Current Outpatient Medications Ordered in Epic   Medication Sig Dispense Refill    benzonatate (TESSALON) 100 MG Cap Take 1 Capsule by mouth 3 times a day as needed for Cough. 60 Capsule 3    rosuvastatin (CRESTOR) 10 MG Tab Take 1 Tablet by mouth every evening. 90 Tablet 3    levothyroxine (SYNTHROID) 75 MCG Tab Take 1 Tablet by mouth every morning on an empty stomach. 90 Tablet 0    triamcinolone acetonide (KENALOG) 0.1 % Cream Apply 1 Application topically 2 times a day. (Patient not taking: Reported on 4/11/2023) 15 g 0    methocarbamol (ROBAXIN) 500 MG Tab Take 1-2 Tablets by mouth 4 times a day as needed. (Patient not taking: Reported on 6/20/2023) 90 Tablet 2     No current Epic-ordered facility-administered medications on file.       Health Maintenance: " "scheduled for any wellness visit    Review of Systems   Constitutional:  Negative for chills, fever and malaise/fatigue.   Respiratory:  Negative for cough, shortness of breath and wheezing.    Cardiovascular:  Negative for chest pain, palpitations and leg swelling.   Genitourinary:  Positive for frequency and urgency. Negative for dysuria.   Neurological:  Negative for dizziness and headaches.   Psychiatric/Behavioral:  Negative for depression and suicidal ideas.          Objective:     Exam:  /60 (BP Location: Left arm, Patient Position: Sitting, BP Cuff Size: Adult)   Pulse 72   Temp 37.1 °C (98.7 °F) (Temporal)   Ht 1.651 m (5' 5\")   Wt 87.1 kg (192 lb)   SpO2 98%   BMI 31.95 kg/m²  Body mass index is 31.95 kg/m².    Constitutional: Alert, no distress, well-groomed.  Skin: Warm, dry, good turgor, no rashes in visible areas.  Eye: Equal, round and reactive, conjunctiva clear, lids normal.  ENMT: Lips without lesions, good dentition, moist mucous membranes.  Neck: Trachea midline, no masses, no thyromegaly.  Respiratory: Unlabored respiratory effort, no cough.  MSK: Normal gait, moves all extremities.  Neuro: Grossly non-focal.   Psych: Alert and oriented x3, normal affect and mood.   Declines pelvic exam  Assessment & Plan:     67 y.o. female with the following -     Problem List Items Addressed This Visit       Hypothyroidism due to acquired atrophy of thyroid    Relevant Orders    TSH WITH REFLEX TO FT4    Major depressive disorder    Mixed hyperlipidemia     - Continue rosuvastatin 10 mg by mouth daily.         Mixed stress and urge urinary incontinence     - Bladder training information provided  -Recommended trying Impressa  -Discussed recommendation of trialing pelvic floor physical therapy, patient declines  -Exam declined  -referral to urology provided         Relevant Orders    Referral to Urology    Obesity (BMI 30-39.9)    Relevant Orders    Patient identified as having weight management " issue.  Appropriate orders and counseling given.     Other Visit Diagnoses       Elevated glucose        Relevant Orders    POCT  A1C (Completed)            Return in about 3 months (around 9/20/2023) for AWV.    I have placed the below orders and discussed them with an approved delegating provider. The MA is performing the below orders under the direction of Dr. Kan.     Please note that this dictation was created using voice recognition software. I have made every reasonable attempt to correct obvious errors, but I expect that there are errors of grammar and possibly content that I did not discover before finalizing the note.

## 2023-06-20 NOTE — ASSESSMENT & PLAN NOTE
- Bladder training information provided  -Recommended trying Impressa  -Discussed recommendation of trialing pelvic floor physical therapy, patient declines  -Exam declined  -referral to urology provided

## 2023-06-22 ENCOUNTER — OFFICE VISIT (OUTPATIENT)
Dept: MEDICAL GROUP | Facility: PHYSICIAN GROUP | Age: 68
End: 2023-06-22
Payer: MEDICARE

## 2023-06-22 VITALS
DIASTOLIC BLOOD PRESSURE: 60 MMHG | OXYGEN SATURATION: 95 % | TEMPERATURE: 99.2 F | WEIGHT: 192.2 LBS | HEART RATE: 84 BPM | SYSTOLIC BLOOD PRESSURE: 124 MMHG | BODY MASS INDEX: 32.02 KG/M2 | HEIGHT: 65 IN

## 2023-06-22 DIAGNOSIS — Z85.820 HISTORY OF MALIGNANT MELANOMA OF SKIN: ICD-10-CM

## 2023-06-22 DIAGNOSIS — J30.89 ENVIRONMENTAL AND SEASONAL ALLERGIES: ICD-10-CM

## 2023-06-22 DIAGNOSIS — N39.46 MIXED STRESS AND URGE URINARY INCONTINENCE: ICD-10-CM

## 2023-06-22 DIAGNOSIS — Z00.00 MEDICARE ANNUAL WELLNESS VISIT, SUBSEQUENT: Primary | ICD-10-CM

## 2023-06-22 DIAGNOSIS — E03.4 HYPOTHYROIDISM DUE TO ACQUIRED ATROPHY OF THYROID: ICD-10-CM

## 2023-06-22 DIAGNOSIS — E78.2 MIXED HYPERLIPIDEMIA: ICD-10-CM

## 2023-06-22 PROBLEM — F32.9 MAJOR DEPRESSIVE DISORDER: Status: RESOLVED | Noted: 2020-05-06 | Resolved: 2023-06-22

## 2023-06-22 PROBLEM — F41.9 ANXIETY: Status: RESOLVED | Noted: 2018-03-08 | Resolved: 2023-06-22

## 2023-06-22 PROCEDURE — G0439 PPPS, SUBSEQ VISIT: HCPCS | Performed by: NURSE PRACTITIONER

## 2023-06-22 PROCEDURE — 3074F SYST BP LT 130 MM HG: CPT | Performed by: NURSE PRACTITIONER

## 2023-06-22 PROCEDURE — 3078F DIAST BP <80 MM HG: CPT | Performed by: NURSE PRACTITIONER

## 2023-06-22 ASSESSMENT — PATIENT HEALTH QUESTIONNAIRE - PHQ9: CLINICAL INTERPRETATION OF PHQ2 SCORE: 0

## 2023-06-22 ASSESSMENT — ENCOUNTER SYMPTOMS: GENERAL WELL-BEING: EXCELLENT

## 2023-06-22 ASSESSMENT — ACTIVITIES OF DAILY LIVING (ADL): BATHING_REQUIRES_ASSISTANCE: 0

## 2023-06-22 ASSESSMENT — FIBROSIS 4 INDEX: FIB4 SCORE: 1.63

## 2023-07-27 ENCOUNTER — APPOINTMENT (OUTPATIENT)
Dept: DERMATOLOGY | Facility: IMAGING CENTER | Age: 68
End: 2023-07-27

## 2023-07-27 ENCOUNTER — OFFICE VISIT (OUTPATIENT)
Dept: DERMATOLOGY | Facility: IMAGING CENTER | Age: 68
End: 2023-07-27
Payer: MEDICARE

## 2023-07-27 DIAGNOSIS — D22.9 MULTIPLE NEVI: ICD-10-CM

## 2023-07-27 DIAGNOSIS — D18.01 CHERRY ANGIOMA: ICD-10-CM

## 2023-07-27 DIAGNOSIS — L57.0 ACTINIC KERATOSIS: ICD-10-CM

## 2023-07-27 DIAGNOSIS — L85.1 STUCCO KERATOSES: ICD-10-CM

## 2023-07-27 DIAGNOSIS — Z12.83 SKIN CANCER SCREENING: ICD-10-CM

## 2023-07-27 DIAGNOSIS — L81.4 LENTIGINES: ICD-10-CM

## 2023-07-27 DIAGNOSIS — L82.1 SK (SEBORRHEIC KERATOSIS): ICD-10-CM

## 2023-07-27 PROCEDURE — 99213 OFFICE O/P EST LOW 20 MIN: CPT | Mod: 25 | Performed by: NURSE PRACTITIONER

## 2023-07-27 PROCEDURE — 17000 DESTRUCT PREMALG LESION: CPT | Performed by: NURSE PRACTITIONER

## 2023-07-27 RX ORDER — FLUOROURACIL 50 MG/G
CREAM TOPICAL
Qty: 40 G | Refills: 2 | Status: SHIPPED | OUTPATIENT
Start: 2023-07-27 | End: 2023-12-13 | Stop reason: SDUPTHER

## 2023-07-27 NOTE — PROGRESS NOTES
DERMATOLOGY NOTE  NEW VISIT       Chief complaint: Follow-Up     SHAMA no new concerns today           History of skin cancer: Melanoma under right eye, no SNBx, WLE by plastics in 2013  History of precancers/actinic keratoses: Yes, Details: yes   History of biopsies:Yes, Details: .  History of blistering/severe sunburns:yes   Family history of skin cancer:Yes, Details: .  Family history of atypical moles:Yes, Details: .          Allergies   Allergen Reactions    Grass Pollen(K-[id number]-[id number] Robby)         MEDICATIONS:  Medications relevant to specialty reviewed.     REVIEW OF SYSTEMS:   Positive for skin (see HPI)  Negative for fevers and chills       EXAM:  There were no vitals taken for this visit.  Constitutional: Well-developed, well-nourished, and in no distress.     A total body skin exam was performed excluding the genitals per patient preference and including the following areas: head (including face), neck, chest, abdomen, groin/buttocks, back, bilateral upper extremities, and bilateral lower extremities with the following pertinent findings listed below and/or in assessment/plan.     -sun exposed skin of trunk and b/l upper, lower extremities and face with scattered clinically benign light brown reticulated macules all of which were morphologically similar and none of which were suspicious for skin cancer today on exam  Multiple tan medium brown skin-colored macules papules scattered over the trunk >> extremities--All with benign-appearing pigment network patterns on dermoscopy  Several tan medium brown skin-colored stuck-on waxy papules scattered on the trunk and extremities  Several scattered 1-3mm bright red macules and thin papules on the trunk and extremities  Stucco keratosis bilateral extremity   Ill-defined erythematous gritty/scaly papule over the nasal bone , numerous onbilateral forearms         IMPRESSION / PLAN:    1. Actinic keratosis  - NMSC education/counseling   CRYOTHERAPY:  Risks (including,  but not limited to: skin discoloration, redness, blister, blood blister, recurrence, need for further treatment, infection, scar) and benefits of cryotherapy discussed. Patient verbally agreed to proceed with treatment. 1 cryotherapy freeze thaw cycles of 10 seconds were applied to 1 lesion on nose with cryac. Patient tolerated procedure well. Aftercare instructions given--no specific care needed unless irritated during healing process, can apply Vaseline with small band-aid if needed.    - fluorouracil (EFUDEX) 5 % cream; AAA, arms and backs of hand, twice a day for 2-4 weeks until red, robust reaction, then STOP. Start this in fall or winter, see me 6 weeks after stopping  Dispense: 40 g; Refill: 2    2. Lentigines  - Benign-appearing nature of lesions discussed during exam.   - Advised to continue to monitor for any return to clinic for new or concerning changes.      3. Multiple nevi  - Benign-appearing nature of lesions discussed during exam.   - Advised to continue to monitor for any return to clinic for new or concerning changes.  - ABCDE's of melanoma discussed/handout given      4. SK (seborrheic keratosis)  - Benign-appearing nature of lesions discussed during exam.   - Advised to continue to monitor for any return to clinic for new or concerning changes.      5. Cherry angioma  - Benign-appearing nature of lesions discussed during exam.   - Advised to continue to monitor for any return to clinic for new or concerning changes.      6. Stucco keratoses  - Benign-appearing nature of lesions discussed during exam.   - Advised to continue to monitor for any return to clinic for new or concerning changes.      7. Skin cancer screening  Skin cancer education  discussed importance of sun protective clothing, eyewear in addition to the use of broad spectrum sunscreen with SPF 30 or greater, as well as need for reapplication ~every 2 hours when exposed to UVR/handout given  discussed importance following up for any  new or changing lesions as noted in handout given, but every 12 months exams in clinic in the setting of dermatologic history  ABCDE's of melanoma discussed/handout given        Discussed risks associated with LN2,  Patient verbalized understanding and agrees with plan regarding the above        Please note that this dictation was created using voice recognition software. I have made every reasonable attempt to correct obvious errors, but I expect that there are errors of grammar and possibly content that I did not discover before finalizing the note.      Return to clinic in: Return in about 1 year (around 7/27/2024) for SHAMA and 6 weeks after efudex tx completed. and as needed for any new or changing skin lesions.

## 2023-08-10 DIAGNOSIS — E03.4 HYPOTHYROIDISM DUE TO ACQUIRED ATROPHY OF THYROID: ICD-10-CM

## 2023-08-10 RX ORDER — LEVOTHYROXINE SODIUM 0.07 MG/1
75 TABLET ORAL
Qty: 90 TABLET | Refills: 0 | Status: SHIPPED | OUTPATIENT
Start: 2023-08-10 | End: 2023-11-07

## 2023-08-25 NOTE — PROGRESS NOTES
Chief Complaint   Patient presents with    Annual Exam       HPI:  Kim is a 67 y.o. here for Medicare Annual Wellness Visit    Problem   Mixed Stress and Urge Urinary Incontinence    Chronic in nature.  Patient states she will make appointment with urology to discuss.     Environmental and Seasonal Allergies    Chronic in nature.  Will.  Patient states that she is well managed with over-the-counter medications.  States that her symptoms have overall started to resolve this season.     History of Malignant Melanoma of Skin    Chronic in nature. Referral to follow-up.  States that she has not been seen in approximately 18 months for skin check, states that she would like to be referred.     Hypothyroidism Due to Acquired Atrophy of Thyroid    Chronic in nature.  Stable.  Patient is feeling overall well and would like to stay on her current dose of Synthroid.  We will recheck labs in 2 months.     Mixed Hyperlipidemia    Chronic in nature.  Stable.   Patient continues to take rosuvastatin 10 mg daily.  Denies side effects.     Major Depressive Disorder (Resolved)    Denies any symptoms of depression.  States that she is feeling very well related to job promotion.     Anxiety (Resolved)         Patient Active Problem List    Diagnosis Date Noted    Mixed stress and urge urinary incontinence 06/20/2023    Environmental and seasonal allergies 04/11/2023    History of malignant melanoma of skin 03/30/2018    Obesity (BMI 30-39.9) 03/08/2018    Hypothyroidism due to acquired atrophy of thyroid 03/08/2018    Mixed hyperlipidemia 03/08/2018       Current Outpatient Medications   Medication Sig Dispense Refill    benzonatate (TESSALON) 100 MG Cap Take 1 Capsule by mouth 3 times a day as needed for Cough. 60 Capsule 3    rosuvastatin (CRESTOR) 10 MG Tab Take 1 Tablet by mouth every evening. 90 Tablet 3    levothyroxine (SYNTHROID) 75 MCG Tab Take 1 Tablet by mouth every morning on an empty stomach. 90 Tablet 0     No current  facility-administered medications for this visit.        Patient is taking medications as noted in medication list.  Current supplements as per medication list.     Allergies: Grass pollen(k-o-r-t-swt terri)    Current social contact/activities: golfing     Is patient current with immunizations? Yes.    She  reports that she has never smoked. She has never used smokeless tobacco. She reports current alcohol use of about 1.8 oz of alcohol per week. She reports that she does not currently use drugs after having used the following drugs: Marijuana.  Counseling given: Not Answered      ROS:    Gait: Uses no assistive device   Ostomy: No   Other tubes: No   Amputations: No   Chronic oxygen use No   Last eye exam 2022   Wears hearing aids: No   : Reports urinary leakage during the last 6 months that has somewhat interfered with their daily activities or sleep.    Screening:    Depression Screening  Little interest or pleasure in doing things?  0 - not at all  Feeling down, depressed, or hopeless? 0 - not at all  Trouble falling or staying asleep, or sleeping too much?     Feeling tired or having little energy?     Poor appetite or overeating?     Feeling bad about yourself - or that you are a failure or have let yourself or your family down?    Trouble concentrating on things, such as reading the newspaper or watching television?    Moving or speaking so slowly that other people could have noticed.  Or the opposite - being so fidgety or restless that you have been moving around a lot more than usual?     Thoughts that you would be better off dead, or of hurting yourself?     Patient Health Questionnaire Score:      If depressive symptoms identified deferred to follow up visit unless specifically addressed in assessment and plan.    Interpretation of PHQ-9 Total Score   Score Severity   1-4 No Depression   5-9 Mild Depression   10-14 Moderate Depression   15-19 Moderately Severe Depression   20-27 Severe  Depression    Screening for Cognitive Impairment  Three Minute Recall (Banana, Sunrise, Chair)  3/3    Draw clock face with all 12 numbers and set the hands to show 20 past 8.  Yes    If cognitive concerns identified, deferred for follow up unless specifically addressed in assessment and plan.    Fall Risk Assessment  Has the patient had two or more falls in the last year or any fall with injury in the last year?  No  If fall risk identified, deferred for follow up unless specifically addressed in assessment and plan.    Safety Assessment  Throw rugs on floor.  No  Handrails on all stairs.  Yes  Good lighting in all hallways.  Yes  Difficulty hearing.  No  Patient counseled about all safety risks that were identified.    Functional Assessment ADLs  Are there any barriers preventing you from cooking for yourself or meeting nutritional needs?  No.    Are there any barriers preventing you from driving safely or obtaining transportation?  No.    Are there any barriers preventing you from using a telephone or calling for help?  No.    Are there any barriers preventing you from shopping?  No.    Are there any barriers preventing you from taking care of your own finances?  No.    Are there any barriers preventing you from managing your medications?  No.    Are there any barriers preventing you from showering, bathing or dressing yourself?  No.    Are you currently engaging in any exercise or physical activity?  Yes.     What is your perception of your health?  Excellent.    Advance Care Planning  Do you have an Advance Directive, Living Will, Durable Power of , or POLST? Yes          is on file    Health Maintenance Summary            Overdue - IMM ZOSTER VACCINES (1 of 2) Overdue - never done      No completion history exists for this topic.              Overdue - COVID-19 Vaccine (2 - Pfizer series) Overdue since 3/21/2023      11/21/2022  Imm Admin: MODERNA BIVALENT BOOSTER SARS-COV-2 VACCINE (6+)    07/01/2022   Imm Admin: MODERNA SARS-COV-2 VACCINE (12+)    11/17/2021  Imm Admin: MODERNA SARS-COV-2 VACCINE (12+)    04/02/2021  Imm Admin: MODERNA SARS-COV-2 VACCINE (12+)    03/05/2021  Imm Admin: MODERNA SARS-COV-2 VACCINE (12+)              Overdue - Annual Wellness Visit (Every 366 Days) Overdue since 5/27/2023 05/26/2022  Level of Service: ANNUAL WELLNESS VISIT-INCLUDES PPPS SUBSEQUE*    05/26/2022  Visit Dx: Medicare annual wellness visit, subsequent              Postponed - BONE DENSITY (Every 5 Years) Postponed until 4/11/2024      No completion history exists for this topic.              Postponed - IMM DTaP/Tdap/Td Vaccine (1 - Tdap) Postponed until 4/11/2024      No completion history exists for this topic.              Postponed - IMM PNEUMOCOCCAL VACCINE: 65+ Years (1 - PCV) Postponed until 4/11/2024      No completion history exists for this topic.              IMM INFLUENZA (Season Ended) Next due on 9/1/2023      No completion history exists for this topic.              MAMMOGRAM (Every 2 Years) Next due on 6/24/2024 06/24/2022  MA-SCREENING MAMMO BILAT W/TOMOSYNTHESIS W/CAD              COLORECTAL CANCER SCREENING (COLONOSCOPY - Every 10 Years) Next due on 6/9/2025 06/09/2015  REFERRAL TO GI FOR COLONOSCOPY              IMM HEP B VACCINE (Series Information) Aged Out      No completion history exists for this topic.              HPV Vaccines (Series Information) Aged Out      No completion history exists for this topic.              IMM MENINGOCOCCAL ACWY VACCINE (Series Information) Aged Out      No completion history exists for this topic.              Discontinued - CERVICAL CANCER SCREENING  Discontinued        Frequency changed to Never automatically (Topic No Longer Applies)    05/27/2021  THINPREP PAP WITH HPV    05/27/2021  Pathology Gynecology Specimen              Discontinued - HEPATITIS C SCREENING  Discontinued      No completion history exists for this topic.               "      Patient Care Team:  AMADOR Valerio as PCP - General (Family Medicine)  AMADOR Valerio as PCP - [facility] Paneled    Social History     Tobacco Use    Smoking status: Never    Smokeless tobacco: Never   Vaping Use    Vaping Use: Never used   Substance Use Topics    Alcohol use: Yes     Alcohol/week: 1.8 oz     Types: 3 Standard drinks or equivalent per week     Comment: occasional    Drug use: Not Currently     Types: Marijuana     Family History   Problem Relation Age of Onset    Heart Disease Mother         CHF    Thyroid Mother     Seizures Mother     Cancer Father         skin    Heart Disease Father 54        MI     She  has a past medical history of History of malignant melanoma of skin (3/30/2018), Hyperlipidemia, and Hypothyroidism.    She has no past medical history of Addisons disease (HCC), Adrenal disorder (HCC), Allergy, Anemia, Arrhythmia, Arthritis, Asthma, Blood transfusion without reported diagnosis, Cancer (HCC), Cataract, CHF (congestive heart failure) (HCC), Clotting disorder (HCC), COPD (chronic obstructive pulmonary disease) (HCC), Cushings syndrome (HCC), Diabetes (HCC), Diabetic neuropathy (HCC), GERD (gastroesophageal reflux disease), Glaucoma, Goiter, Head ache, Heart attack (HCC), Heart murmur, HIV (human immunodeficiency virus infection) (HCC), Hypertension, IBD (inflammatory bowel disease), Kidney disease, Meningitis, Migraine, Muscle disorder, Osteoporosis, Parathyroid disorder (HCC), Pituitary disease (HCC), Pulmonary emphysema (HCC), Seizure (HCC), Sickle cell disease (HCC), Stroke (HCC), Substance abuse (HCC), Tuberculosis, or Urinary tract infection.   Past Surgical History:   Procedure Laterality Date    BIOPSY GENERAL      skin       Exam:   /60 (BP Location: Left arm, Patient Position: Sitting, BP Cuff Size: Adult)   Pulse 84   Temp 37.3 °C (99.2 °F) (Temporal)   Ht 1.651 m (5' 5\")   Wt 87.2 kg (192 lb 3.2 oz)   SpO2 95%  Body " mass index is 31.98 kg/m².    Hearing excellent.    Dentition good  Alert, oriented in no acute distress  Eye contact is good, speech goal directed, affect calm      Assessment and Plan. The following treatment and monitoring plan is recommended:    Problem List Items Addressed This Visit       Environmental and seasonal allergies     - Continue over-the-counter medication as needed         History of malignant melanoma of skin     Referral to dermatology.         Relevant Orders    Referral to Dermatology    Hypothyroidism due to acquired atrophy of thyroid     - Continue Synthroid 75 mcg daily         Mixed hyperlipidemia     - Continue rosuvastatin.  Labs are up-to-date.         Mixed stress and urge urinary incontinence     - Patient will follow-up with urology                 Services suggested: No services needed at this time  Health Care Screening recommendations as per orders if indicated.  Referrals offered: PT/OT/Nutrition counseling/Behavioral Health/Smoking cessation as per orders if indicated.    Discussion today about general wellness and lifestyle habits:    Prevent falls and reduce trip hazards; Cautioned about securing or removing rugs.  Have a working fire alarm and carbon monoxide detector;   Engage in regular physical activity and social activities.     Follow-up: Return in about 6 months (around 12/22/2023), or if symptoms worsen or fail to improve.   Skin Substitute: EpiFix Micronized

## 2023-11-07 DIAGNOSIS — E03.4 HYPOTHYROIDISM DUE TO ACQUIRED ATROPHY OF THYROID: ICD-10-CM

## 2023-11-07 RX ORDER — LEVOTHYROXINE SODIUM 0.07 MG/1
75 TABLET ORAL
Qty: 90 TABLET | Refills: 0 | Status: SHIPPED | OUTPATIENT
Start: 2023-11-07 | End: 2024-03-05

## 2023-12-13 ENCOUNTER — OFFICE VISIT (OUTPATIENT)
Dept: MEDICAL GROUP | Facility: PHYSICIAN GROUP | Age: 68
End: 2023-12-13
Payer: MEDICARE

## 2023-12-13 VITALS
SYSTOLIC BLOOD PRESSURE: 110 MMHG | BODY MASS INDEX: 32.22 KG/M2 | DIASTOLIC BLOOD PRESSURE: 74 MMHG | HEIGHT: 65 IN | OXYGEN SATURATION: 98 % | WEIGHT: 193.4 LBS | HEART RATE: 72 BPM | TEMPERATURE: 97.3 F

## 2023-12-13 DIAGNOSIS — H92.01 RIGHT EAR PAIN: ICD-10-CM

## 2023-12-13 DIAGNOSIS — E78.2 MIXED HYPERLIPIDEMIA: ICD-10-CM

## 2023-12-13 DIAGNOSIS — L57.0 ACTINIC KERATOSIS: ICD-10-CM

## 2023-12-13 DIAGNOSIS — H66.001 NON-RECURRENT ACUTE SUPPURATIVE OTITIS MEDIA OF RIGHT EAR WITHOUT SPONTANEOUS RUPTURE OF TYMPANIC MEMBRANE: ICD-10-CM

## 2023-12-13 DIAGNOSIS — E03.4 HYPOTHYROIDISM DUE TO ACQUIRED ATROPHY OF THYROID: ICD-10-CM

## 2023-12-13 PROCEDURE — 3078F DIAST BP <80 MM HG: CPT | Performed by: NURSE PRACTITIONER

## 2023-12-13 PROCEDURE — 99214 OFFICE O/P EST MOD 30 MIN: CPT | Performed by: NURSE PRACTITIONER

## 2023-12-13 PROCEDURE — 3074F SYST BP LT 130 MM HG: CPT | Performed by: NURSE PRACTITIONER

## 2023-12-13 RX ORDER — FLUOROURACIL 50 MG/G
CREAM TOPICAL
Qty: 40 G | Refills: 2 | Status: SHIPPED | OUTPATIENT
Start: 2023-12-13

## 2023-12-13 RX ORDER — AMOXICILLIN AND CLAVULANATE POTASSIUM 875; 125 MG/1; MG/1
1 TABLET, FILM COATED ORAL 2 TIMES DAILY
Qty: 14 TABLET | Refills: 0 | Status: SHIPPED | OUTPATIENT
Start: 2023-12-13 | End: 2023-12-20

## 2023-12-13 RX ORDER — MIRABEGRON 25 MG/1
TABLET, FILM COATED, EXTENDED RELEASE ORAL
COMMUNITY
Start: 2023-07-17 | End: 2023-12-13

## 2023-12-13 RX ORDER — LEVOTHYROXINE SODIUM 0.05 MG/1
TABLET ORAL
COMMUNITY
End: 2023-12-13

## 2023-12-13 ASSESSMENT — ENCOUNTER SYMPTOMS
COUGH: 0
DIZZINESS: 0
DEPRESSION: 0
FEVER: 0
SHORTNESS OF BREATH: 0
HEARTBURN: 0
PALPITATIONS: 0
HEADACHES: 0
CHILLS: 0
MYALGIAS: 0
WHEEZING: 0
ABDOMINAL PAIN: 0

## 2023-12-13 ASSESSMENT — FIBROSIS 4 INDEX: FIB4 SCORE: 1.66

## 2023-12-13 NOTE — PROGRESS NOTES
"Subjective:     Chief Complaint   Patient presents with    Otalgia     Right side on and off x 2 weeks getting worse     Oral Swelling     Right side swelling     Tired    Medication Refill     Fluorouracil 5% cream       HPI:   Kim presents today with     Problem   Right Ear Pain    This is a new issue to this provider. States started about 2 weeks ago. States energy and concentration has been low. Sleeping 10 hours/night waking in the am with ear pain. States dull pain, this am thought \"maybe it is better\". Started after her flight home from [place]. States the sharp pain 1 week ago. States popped 1 day. No congestions, states swollen gland. No sinus pain/pressure. No fever chills. No tooth or jaw pain. Did have a cough and mucous about 1 week ago. No coughing now. States worse at night or 1st thing in the am.      Hypothyroidism Due to Acquired Atrophy of Thyroid    Chronic in nature.  Stable.  Labs for recheck in 2 months were not completed, updated labs are ordered today and patient has requested to get these as soon as possible.         Current Outpatient Medications Ordered in Epic   Medication Sig Dispense Refill    amoxicillin-clavulanate (AUGMENTIN) 875-125 MG Tab Take 1 Tablet by mouth 2 times a day for 7 days. 14 Tablet 0    fluorouracil (EFUDEX) 5 % cream AAA, arms and backs of hand, twice a day for 2-4 weeks until red, robust reaction, then STOP. Start this in fall or winter, see me 6 weeks after stopping 40 g 2    levothyroxine (SYNTHROID) 75 MCG Tab TAKE ONE TABLET BY MOUTH EVERY MORNING ON AN EMPTY STOMACH 90 Tablet 0    benzonatate (TESSALON) 100 MG Cap Take 1 Capsule by mouth 3 times a day as needed for Cough. 60 Capsule 3    rosuvastatin (CRESTOR) 10 MG Tab Take 1 Tablet by mouth every evening. 90 Tablet 3     No current Epic-ordered facility-administered medications on file.       Health Maintenance: Completed    Review of Systems   Constitutional:  Negative for chills, fever and malaise/fatigue. " "  Respiratory:  Negative for cough, shortness of breath and wheezing.    Cardiovascular:  Negative for chest pain, palpitations and leg swelling.   Gastrointestinal:  Negative for abdominal pain and heartburn.   Genitourinary:  Negative for dysuria.   Musculoskeletal:  Negative for myalgias.   Neurological:  Negative for dizziness and headaches.   Psychiatric/Behavioral:  Negative for depression and suicidal ideas.          Objective:     Exam:  /74 (BP Location: Left arm, Patient Position: Sitting, BP Cuff Size: Adult)   Pulse 72   Temp 36.3 °C (97.3 °F) (Temporal)   Ht 1.651 m (5' 5\")   Wt 87.7 kg (193 lb 6.4 oz)   LMP  (LMP Unknown)   SpO2 98%   BMI 32.18 kg/m²  Body mass index is 32.18 kg/m².    Physical Exam  Constitutional:       Appearance: Normal appearance.   HENT:      Head: Normocephalic and atraumatic.   Cardiovascular:      Rate and Rhythm: Normal rate and regular rhythm.   Pulmonary:      Effort: Pulmonary effort is normal.      Breath sounds: Normal breath sounds.   Skin:     General: Skin is warm and dry.      Capillary Refill: Capillary refill takes less than 2 seconds.   Neurological:      General: No focal deficit present.      Mental Status: She is alert and oriented to person, place, and time.       Assessment & Plan:     68 y.o. female with the following -     Problem List Items Addressed This Visit       Hypothyroidism due to acquired atrophy of thyroid     -Continue Synthroid 75 mcg by mouth daily         Relevant Orders    TSH WITH REFLEX TO FT4    Mixed hyperlipidemia    Relevant Orders    Comp Metabolic Panel    Lipid Profile    Right ear pain     -assessment consistent with otitis media  -augmentin 875/125 mg PO twice daily for 7 days          Other Visit Diagnoses       Non-recurrent acute suppurative otitis media of right ear without spontaneous rupture of tympanic membrane        Relevant Medications    amoxicillin-clavulanate (AUGMENTIN) 875-125 MG Tab    Actinic " keratosis        Relevant Medications    fluorouracil (EFUDEX) 5 % cream              Return in about 1 month (around 1/13/2024), or if symptoms worsen or fail to improve.    I have placed the below orders and discussed them with an approved delegating provider. The MA is performing the below orders under the direction of Dr. Bales.     Please note that this dictation was created using voice recognition software. I have made every reasonable attempt to correct obvious errors, but I expect that there are errors of grammar and possibly content that I did not discover before finalizing the note.

## 2024-01-04 ENCOUNTER — OFFICE VISIT (OUTPATIENT)
Dept: MEDICAL GROUP | Facility: PHYSICIAN GROUP | Age: 69
End: 2024-01-04
Payer: MEDICARE

## 2024-01-04 ENCOUNTER — HOSPITAL ENCOUNTER (OUTPATIENT)
Facility: MEDICAL CENTER | Age: 69
End: 2024-01-04
Attending: NURSE PRACTITIONER
Payer: MEDICARE

## 2024-01-04 VITALS
OXYGEN SATURATION: 96 % | BODY MASS INDEX: 33.09 KG/M2 | TEMPERATURE: 97.5 F | HEART RATE: 84 BPM | SYSTOLIC BLOOD PRESSURE: 110 MMHG | WEIGHT: 198.6 LBS | DIASTOLIC BLOOD PRESSURE: 70 MMHG | HEIGHT: 65 IN

## 2024-01-04 DIAGNOSIS — H92.01 RIGHT EAR PAIN: ICD-10-CM

## 2024-01-04 DIAGNOSIS — R10.819 SUPRAPUBIC TENDERNESS: ICD-10-CM

## 2024-01-04 LAB
APPEARANCE UR: CLEAR
BILIRUB UR STRIP-MCNC: NEGATIVE MG/DL
COLOR UR AUTO: YELLOW
GLUCOSE UR STRIP.AUTO-MCNC: NEGATIVE MG/DL
KETONES UR STRIP.AUTO-MCNC: NEGATIVE MG/DL
LEUKOCYTE ESTERASE UR QL STRIP.AUTO: NEGATIVE
NITRITE UR QL STRIP.AUTO: NEGATIVE
PH UR STRIP.AUTO: 5 [PH] (ref 5–8)
PROT UR QL STRIP: NEGATIVE MG/DL
RBC UR QL AUTO: NEGATIVE
SP GR UR STRIP.AUTO: 1
UROBILINOGEN UR STRIP-MCNC: 0.2 MG/DL

## 2024-01-04 PROCEDURE — 3074F SYST BP LT 130 MM HG: CPT | Performed by: NURSE PRACTITIONER

## 2024-01-04 PROCEDURE — 87660 TRICHOMONAS VAGIN DIR PROBE: CPT

## 2024-01-04 PROCEDURE — 87510 GARDNER VAG DNA DIR PROBE: CPT

## 2024-01-04 PROCEDURE — 87480 CANDIDA DNA DIR PROBE: CPT

## 2024-01-04 PROCEDURE — 87086 URINE CULTURE/COLONY COUNT: CPT

## 2024-01-04 PROCEDURE — 3078F DIAST BP <80 MM HG: CPT | Performed by: NURSE PRACTITIONER

## 2024-01-04 PROCEDURE — 81002 URINALYSIS NONAUTO W/O SCOPE: CPT | Performed by: NURSE PRACTITIONER

## 2024-01-04 PROCEDURE — 99213 OFFICE O/P EST LOW 20 MIN: CPT | Performed by: NURSE PRACTITIONER

## 2024-01-04 ASSESSMENT — FIBROSIS 4 INDEX: FIB4 SCORE: 1.66

## 2024-01-04 ASSESSMENT — PATIENT HEALTH QUESTIONNAIRE - PHQ9: CLINICAL INTERPRETATION OF PHQ2 SCORE: 0

## 2024-01-05 LAB
CANDIDA DNA VAG QL PROBE+SIG AMP: NEGATIVE
G VAGINALIS DNA VAG QL PROBE+SIG AMP: NEGATIVE
T VAGINALIS DNA VAG QL PROBE+SIG AMP: NEGATIVE

## 2024-01-05 NOTE — PROGRESS NOTES
"Chief Complaint   Patient presents with    Pharyngitis     Right side sick since 12/13/23    UTI    Otalgia     Right side sick since 12/13/23     Problem   Suprapubic Tenderness    Reports that after taking the antibiotic, she has developed some suprapubic discomfort. Denies dysuria, vaginal discharge, hematuria. Reports some urgency and small volume voids. She is drinking a lot of water.      Right Ear Pain    This is a new issue to this provider. States started about 2 weeks ago. States energy and concentration has been low. Sleeping 10 hours/night waking in the am with ear pain. States dull pain, this am thought \"maybe it is better\". Started after her flight home from [place]. States the sharp pain 1 week ago. States popped 1 day. No congestions, states swollen gland. No sinus pain/pressure. No fever chills. No tooth or jaw pain. Did have a cough and mucous about 1 week ago. No coughing now. States worse at night or 1st thing in the am.     01/04/2024 update:  Reports that her symptoms did not really improve with antibiotic therapy (she was placed on 7 day course of Augmentin 875/125mg starting 12/13/23). She reports that she has now developed some throat irritation/soreness. She has been taking lozenges and Advil for her symptoms. She will wake up in the middle of the night with pain. Denies nasal congestion, rhinorrhea, cough, fever/chills. She is not currently on an antihistamine.        ROS:  No fever, cough, nausea, changes in bowel movements or skin rash.      I reviewed the patient's medications, allergies and medical history:  Current Outpatient Medications   Medication Sig Dispense Refill    fluorouracil (EFUDEX) 5 % cream AAA, arms and backs of hand, twice a day for 2-4 weeks until red, robust reaction, then STOP. Start this in fall or winter, see me 6 weeks after stopping 40 g 2    levothyroxine (SYNTHROID) 75 MCG Tab TAKE ONE TABLET BY MOUTH EVERY MORNING ON AN EMPTY STOMACH 90 Tablet 0    benzonatate " "(TESSALON) 100 MG Cap Take 1 Capsule by mouth 3 times a day as needed for Cough. 60 Capsule 3    rosuvastatin (CRESTOR) 10 MG Tab Take 1 Tablet by mouth every evening. 90 Tablet 3     No current facility-administered medications for this visit.     Grass pollen([id number] terri)  Past Medical History:   Diagnosis Date    History of malignant melanoma of skin 3/30/2018    Hyperlipidemia     Hypothyroidism      EXAM:  /70 (BP Location: Left arm, Patient Position: Sitting, BP Cuff Size: Adult)   Pulse 84   Temp 36.4 °C (97.5 °F) (Temporal)   Ht 1.651 m (5' 5\")   Wt 90.1 kg (198 lb 9.6 oz)   SpO2 96%   General: Alert, no conversational dyspnea or audible wheeze, non-toxic appearance.  Eyes: PERRL, conjunctiva slightly injected, no eye discharge.  Ears: Normal pinnae,R TM not visualized secondary to cerumen bilaterally. Unable to tolerate removal with curette.   Nares: Patent  Sinuses: non tender over maxillary / frontal sinuses.  Throat: R side erythematous without exudate.   Neck: Supple, with mildly enlarged cervical nodes.  Lungs: Clear to auscultation bilaterally, no wheeze, crackles or rhonchi.   Heart: Regular rate without murmur.  Abdomen: soft, + suprapubic tenderness, no CVA tenderness, normal bowel sounds present  Skin: Warm and dry without rash.     Lab Results   Component Value Date/Time    POCCOLOR YELLOW 01/04/2024 04:43 PM    POCAPPEAR CLEAR 01/04/2024 04:43 PM    POCLEUKEST NEGATIVE 01/04/2024 04:43 PM    POCNITRITE NEGATIVE 01/04/2024 04:43 PM    POCUROBILIGE 0.2 01/04/2024 04:43 PM    POCPROTEIN NEGATIVE 01/04/2024 04:43 PM    POCURPH 5.0 01/04/2024 04:43 PM    POCBLOOD NEGATIVE 01/04/2024 04:43 PM    POCSPGRV 1.005 01/04/2024 04:43 PM    POCKETONES NEGATIVE 01/04/2024 04:43 PM    POCBILIRUBIN NEGATIVE 01/04/2024 04:43 PM    POCGLUCUA NEGATIVE 01/04/2024 04:43 PM      ASSESSMENT:   1. Right ear pain  Acute problem, prognosis unknown. I was unable to visualize her right TM, due to cerumen " impaction.  She did feel relief in office with removing some cerumen. We did have to stop as she did not tolerate the procedure. Thus, I recommended she clear her earwax at home, which may provide her relief. She can also consider a trial with a second generation anti-histamine. If her ear pain persists, I would recommend she return to office for further evaluation.    2. Suprapubic tenderness  Acute problem. Her urinalysis was completely normal in office. Given her symptoms began after treatment with antibiotic, will evaluate for a vaginal yeast infection. Will also rule out a UTI via urine culture.   - POCT Urinalysis  - VAGINAL PATHOGENS DNA PANEL; Future  - URINE CULTURE(NEW); Future

## 2024-01-07 LAB
BACTERIA UR CULT: NORMAL
SIGNIFICANT IND 70042: NORMAL
SITE SITE: NORMAL
SOURCE SOURCE: NORMAL

## 2024-01-10 ENCOUNTER — OFFICE VISIT (OUTPATIENT)
Dept: MEDICAL GROUP | Facility: PHYSICIAN GROUP | Age: 69
End: 2024-01-10
Payer: MEDICARE

## 2024-01-10 VITALS
DIASTOLIC BLOOD PRESSURE: 70 MMHG | BODY MASS INDEX: 32.82 KG/M2 | HEART RATE: 86 BPM | WEIGHT: 197 LBS | TEMPERATURE: 98 F | OXYGEN SATURATION: 98 % | SYSTOLIC BLOOD PRESSURE: 116 MMHG | HEIGHT: 65 IN

## 2024-01-10 DIAGNOSIS — H92.01 RIGHT EAR PAIN: ICD-10-CM

## 2024-01-10 PROCEDURE — 99212 OFFICE O/P EST SF 10 MIN: CPT | Performed by: NURSE PRACTITIONER

## 2024-01-10 PROCEDURE — 3078F DIAST BP <80 MM HG: CPT | Performed by: NURSE PRACTITIONER

## 2024-01-10 PROCEDURE — 3074F SYST BP LT 130 MM HG: CPT | Performed by: NURSE PRACTITIONER

## 2024-01-10 ASSESSMENT — ENCOUNTER SYMPTOMS
CHILLS: 0
DIZZINESS: 0
HEADACHES: 0
FEVER: 0
SHORTNESS OF BREATH: 0

## 2024-01-10 ASSESSMENT — FIBROSIS 4 INDEX: FIB4 SCORE: 1.66

## 2024-01-10 NOTE — PROGRESS NOTES
"Subjective:     CC: Right ear pain    HPI:   Kim presents today with    Problem   Right Ear Pain    This is a new issue to this provider. States started about 2 weeks ago. States energy and concentration has been low. Sleeping 10 hours/night waking in the am with ear pain. States dull pain, this am thought \"maybe it is better\". Started after her flight home from [place]. States the sharp pain 1 week ago. States popped 1 day. No congestions, states swollen gland. No sinus pain/pressure. No fever chills. No tooth or jaw pain. Did have a cough and mucous about 1 week ago. No coughing now. States worse at night or 1st thing in the am.     01/04/2024 update:  Reports that her symptoms did not really improve with antibiotic therapy (she was placed on 7 day course of Augmentin 875/125mg starting 12/13/23). She reports that she has now developed some throat irritation/soreness. She has been taking lozenges and Advil for her symptoms. She will wake up in the middle of the night with pain. Denies nasal congestion, rhinorrhea, cough, fever/chills. She is not currently on an antihistamine.    01/10/24 Update:  After our last visit, she used a warm compress on her neck and ha also been taking a second generation anti-histamine. About 1-2 days after, she felt a pop in her ear and since, has been feeling better. Her sore throat is also slowly improving. Her R ear just feels a little sore today.        Health Maintenance: Completed    Current Outpatient Medications   Medication Sig Dispense Refill    fluorouracil (EFUDEX) 5 % cream AAA, arms and backs of hand, twice a day for 2-4 weeks until red, robust reaction, then STOP. Start this in fall or winter, see me 6 weeks after stopping 40 g 2    levothyroxine (SYNTHROID) 75 MCG Tab TAKE ONE TABLET BY MOUTH EVERY MORNING ON AN EMPTY STOMACH 90 Tablet 0    benzonatate (TESSALON) 100 MG Cap Take 1 Capsule by mouth 3 times a day as needed for Cough. 60 Capsule 3    rosuvastatin (CRESTOR) 10 " "MG Tab Take 1 Tablet by mouth every evening. 90 Tablet 3     No current facility-administered medications for this visit.     ROS:  Review of Systems   Constitutional:  Negative for chills and fever.   Respiratory:  Negative for shortness of breath.    Cardiovascular:  Negative for chest pain.   Neurological:  Negative for dizziness and headaches.     Objective:     Exam:  /70 (BP Location: Left arm, Patient Position: Sitting, BP Cuff Size: Adult)   Pulse 86   Temp 36.7 °C (98 °F) (Temporal)   Ht 1.651 m (5' 5\")   Wt 89.4 kg (197 lb)   LMP  (LMP Unknown)   SpO2 98%   BMI 32.78 kg/m²  Body mass index is 32.78 kg/m².    Physical Exam  Constitutional:       Appearance: Normal appearance.   HENT:      Right Ear: No swelling or tenderness. There is impacted cerumen.      Left Ear: Tympanic membrane and ear canal normal.   Eyes:      Conjunctiva/sclera: Conjunctivae normal.      Pupils: Pupils are equal, round, and reactive to light.   Cardiovascular:      Rate and Rhythm: Normal rate.   Pulmonary:      Effort: Pulmonary effort is normal.   Musculoskeletal:      Right lower leg: No edema.      Left lower leg: No edema.   Lymphadenopathy:      Cervical: No cervical adenopathy.   Skin:     General: Skin is warm and dry.   Neurological:      General: No focal deficit present.      Mental Status: She is alert and oriented to person, place, and time.   Psychiatric:         Mood and Affect: Mood normal.         Behavior: Behavior normal.       Assessment & Plan:     68 y.o. female with the following -     1. Right ear pain  Acute problem, improving. She tolerated an ear lavage today and we were able to fully remove impacted cerumen. She felt relief post lavage and I was able to visualize her tympanic membrane, which was intact and clear.  I recommended she continue with use of the second generation antihistamine given its benefit.     Return if symptoms worsen or fail to improve.    Please note that this dictation " was created using voice recognition software. I have made every reasonable attempt to correct obvious errors, but I expect that there are errors of grammar and possibly content that I did not discover before finalizing the note.

## 2024-03-05 DIAGNOSIS — E03.4 HYPOTHYROIDISM DUE TO ACQUIRED ATROPHY OF THYROID: ICD-10-CM

## 2024-03-05 RX ORDER — LEVOTHYROXINE SODIUM 0.07 MG/1
75 TABLET ORAL
Qty: 90 TABLET | Refills: 0 | Status: SHIPPED | OUTPATIENT
Start: 2024-03-05

## 2024-03-05 NOTE — TELEPHONE ENCOUNTER
Received request via: Pharmacy    Was the patient seen in the last year in this department? Yes    Does the patient have an active prescription (recently filled or refills available) for medication(s) requested? No    Pharmacy Name: [id number] - [place], [place] - [address]     Does the patient have snf Plus and need 100 day supply (blood pressure, diabetes and cholesterol meds only)? Medication is not for cholesterol, blood pressure or diabetes

## 2024-03-28 ENCOUNTER — HOSPITAL ENCOUNTER (OUTPATIENT)
Dept: LAB | Facility: MEDICAL CENTER | Age: 69
End: 2024-03-28
Attending: NURSE PRACTITIONER
Payer: MEDICARE

## 2024-03-28 ENCOUNTER — OFFICE VISIT (OUTPATIENT)
Dept: MEDICAL GROUP | Facility: PHYSICIAN GROUP | Age: 69
End: 2024-03-28
Payer: MEDICARE

## 2024-03-28 VITALS
HEART RATE: 71 BPM | HEIGHT: 65 IN | SYSTOLIC BLOOD PRESSURE: 128 MMHG | WEIGHT: 196.2 LBS | OXYGEN SATURATION: 97 % | TEMPERATURE: 98.3 F | BODY MASS INDEX: 32.69 KG/M2 | DIASTOLIC BLOOD PRESSURE: 76 MMHG

## 2024-03-28 DIAGNOSIS — E78.2 MIXED HYPERLIPIDEMIA: ICD-10-CM

## 2024-03-28 DIAGNOSIS — M25.511 ACUTE PAIN OF RIGHT SHOULDER: ICD-10-CM

## 2024-03-28 DIAGNOSIS — E03.4 HYPOTHYROIDISM DUE TO ACQUIRED ATROPHY OF THYROID: ICD-10-CM

## 2024-03-28 LAB
ALBUMIN SERPL BCP-MCNC: 4.6 G/DL (ref 3.2–4.9)
ALBUMIN/GLOB SERPL: 1.8 G/DL
ALP SERPL-CCNC: 54 U/L (ref 30–99)
ALT SERPL-CCNC: 55 U/L (ref 2–50)
ANION GAP SERPL CALC-SCNC: 12 MMOL/L (ref 7–16)
AST SERPL-CCNC: 30 U/L (ref 12–45)
BILIRUB SERPL-MCNC: 1.1 MG/DL (ref 0.1–1.5)
BUN SERPL-MCNC: 13 MG/DL (ref 8–22)
CALCIUM ALBUM COR SERPL-MCNC: 9.1 MG/DL (ref 8.5–10.5)
CALCIUM SERPL-MCNC: 9.6 MG/DL (ref 8.5–10.5)
CHLORIDE SERPL-SCNC: 102 MMOL/L (ref 96–112)
CHOLEST SERPL-MCNC: 176 MG/DL (ref 100–199)
CO2 SERPL-SCNC: 24 MMOL/L (ref 20–33)
CREAT SERPL-MCNC: 0.81 MG/DL (ref 0.5–1.4)
FASTING STATUS PATIENT QL REPORTED: NORMAL
GFR SERPLBLD CREATININE-BSD FMLA CKD-EPI: 78 ML/MIN/1.73 M 2
GLOBULIN SER CALC-MCNC: 2.6 G/DL (ref 1.9–3.5)
GLUCOSE SERPL-MCNC: 101 MG/DL (ref 65–99)
HDLC SERPL-MCNC: 49 MG/DL
LDLC SERPL CALC-MCNC: 98 MG/DL
POTASSIUM SERPL-SCNC: 4.2 MMOL/L (ref 3.6–5.5)
PROT SERPL-MCNC: 7.2 G/DL (ref 6–8.2)
SODIUM SERPL-SCNC: 138 MMOL/L (ref 135–145)
TRIGL SERPL-MCNC: 147 MG/DL (ref 0–149)
TSH SERPL DL<=0.005 MIU/L-ACNC: 3.23 UIU/ML (ref 0.38–5.33)

## 2024-03-28 PROCEDURE — 80061 LIPID PANEL: CPT

## 2024-03-28 PROCEDURE — 36415 COLL VENOUS BLD VENIPUNCTURE: CPT

## 2024-03-28 PROCEDURE — 80053 COMPREHEN METABOLIC PANEL: CPT

## 2024-03-28 PROCEDURE — 3078F DIAST BP <80 MM HG: CPT | Performed by: NURSE PRACTITIONER

## 2024-03-28 PROCEDURE — 99214 OFFICE O/P EST MOD 30 MIN: CPT | Performed by: NURSE PRACTITIONER

## 2024-03-28 PROCEDURE — 3074F SYST BP LT 130 MM HG: CPT | Performed by: NURSE PRACTITIONER

## 2024-03-28 PROCEDURE — 84443 ASSAY THYROID STIM HORMONE: CPT

## 2024-03-28 ASSESSMENT — FIBROSIS 4 INDEX: FIB4 SCORE: 1.66

## 2024-03-28 NOTE — PROGRESS NOTES
Verbal consent was acquired by the patient to use [employer] ambient listening note generation during this visit yes    Subjective:     HPI:   Kim is a 68 y.o.female established patient who presents today for:    History of Present Illness  The patient is a 68-year-old female who presents for evaluation of multiple medical concerns.    She fell with her arm outstretched and landed on it 5 or 6 days before she was leaving to go to [facility]. She tried to self-medicate with Tylenol. She thought she pulled a muscle or tendon. She hit her elbow and was wearing enough padding, which was hurting as well. It reverberated and feels like it is in the joint. She can not lay flat. It started going numb. It wakes her up from sleep. She has been taking Tylenol and ibuprofen. She can not work with her right hand. It can be sharp pain, shooting, and burning. It aches right now. She has not taken anything today. She woke up at 12 midnight and could not go back to sleep. She just got back on Tuesday. She wants to get an x-ray. She has pain with twisting. She can raise her arm up over her head, but it is getting better. She can not go backwards. She can hold it, but it hurts a lot. Her range of motion is getting better.    She denies any depression symptoms.    She had her labs done this morning. She can not lose weight. She was eating vegetables the whole time she was there. Her ankles and feet swell so badly that they were itchy on the first day through the 5th day. She thought it was due to the heat and humidity. She wore compression socks on the way home. She denies any shortness of breath.    She probably had anxiety in her chest. She felt a quick pain earlier this morning when she woke up. It is not constant.    Supplemental Information  She was taking antibiotics because she had a tooth pulled. She had a fracture in one of her teeth.     has a past medical history of History of malignant melanoma of skin (3/30/2018),  Hyperlipidemia, and Hypothyroidism.    She has no past medical history of Addisons disease (HCC), Adrenal disorder (HCC), Anemia, Arrhythmia, Arthritis, Asthma, Blood transfusion without reported diagnosis, Cancer (HCC), Cataract, CHF (congestive heart failure) (HCC), Clotting disorder (HCC), COPD (chronic obstructive pulmonary disease) (HCC), Cushings syndrome (HCC), Diabetes (HCC), Diabetic neuropathy (HCC), GERD (gastroesophageal reflux disease), Glaucoma, Goiter, Head ache, Heart attack (HCC), Heart murmur, HIV (human immunodeficiency virus infection) (HCC), Hypertension, IBD (inflammatory bowel disease), Kidney disease, Meningitis, Migraine, Muscle disorder, Osteoporosis, Parathyroid disorder (HCC), Pituitary disease (HCC), Pulmonary emphysema (HCC), Seizure (HCC), Sickle cell disease (HCC), Stroke (HCC), Substance abuse (HCC), Tuberculosis, or Urinary tract infection.   has a past surgical history that includes biopsy general.    Family History   Problem Relation Age of Onset    Heart Disease Mother         CHF    Thyroid Mother     Seizures Mother     Cancer Father         skin    Heart Disease Father 54        MI       Social History     Socioeconomic History    Marital status:      Spouse name: Not on file    Number of children: Not on file    Years of education: Not on file    Highest education level: Not on file   Occupational History    Not on file   Tobacco Use    Smoking status: Never    Smokeless tobacco: Never   Vaping Use    Vaping Use: Never used   Substance and Sexual Activity    Alcohol use: Yes     Alcohol/week: 1.8 oz     Types: 3 Standard drinks or equivalent per week     Comment: occasional    Drug use: Not Currently     Types: Marijuana    Sexual activity: Not Currently   Other Topics Concern    Not on file   Social History Narrative    Not on file     Social Determinants of Health     Financial Resource Strain: Not on file   Food Insecurity: Not on file   Transportation Needs: Not on  "file   Physical Activity: Not on file   Stress: Not on file   Social Connections: Not on file   Intimate Partner Violence: Not on file   Housing Stability: Not on file       Patient Active Problem List    Diagnosis Date Noted    Acute pain of right shoulder 03/28/2024    Suprapubic tenderness 01/04/2024    Right ear pain 12/13/2023    Mixed stress and urge urinary incontinence 06/20/2023    Environmental and seasonal allergies 04/11/2023    History of malignant melanoma of skin 03/30/2018    Obesity (BMI 30-39.9) 03/08/2018    Hypothyroidism due to acquired atrophy of thyroid 03/08/2018    Mixed hyperlipidemia 03/08/2018         Current Outpatient Medications Ordered in Epic   Medication Sig Dispense Refill    levothyroxine (SYNTHROID) 75 MCG Tab TAKE ONE TABLET BY MOUTH EVERY MORNING ON AN EMPTY STOMACH 90 Tablet 0    fluorouracil (EFUDEX) 5 % cream AAA, arms and backs of hand, twice a day for 2-4 weeks until red, robust reaction, then STOP. Start this in fall or winter, see me 6 weeks after stopping 40 g 2    benzonatate (TESSALON) 100 MG Cap Take 1 Capsule by mouth 3 times a day as needed for Cough. 60 Capsule 3    rosuvastatin (CRESTOR) 10 MG Tab Take 1 Tablet by mouth every evening. 90 Tablet 3    meloxicam (MOBIC) 15 MG tablet Take 1 Tablet by mouth 1 time a day as needed for Mild Pain, Moderate Pain or Inflammation for up to 30 days. 30 Tablet 0    methocarbamol (ROBAXIN-750) 750 MG Tab Take 1 Tablet by mouth every 6 hours as needed (muscle spasm) for up to 10 days. 40 Tablet 0     No current Epic-ordered facility-administered medications on file.     Allergies   Allergen Reactions    Grass Pollen([id number] Robby)        Health Maintenance: Completed    Objective:     Exam:  /76 (BP Location: Left arm, Patient Position: Sitting, BP Cuff Size: Adult)   Pulse 71   Temp 36.8 °C (98.3 °F) (Temporal)   Ht 1.651 m (5' 5\")   Wt 89 kg (196 lb 3.2 oz)   LMP  (LMP Unknown)   SpO2 97%   BMI 32.65 kg/m²  " Body mass index is 32.65 kg/m².    Physical Exam  Constitutional:       Appearance: Normal appearance.   HENT:      Head: Normocephalic and atraumatic.   Cardiovascular:      Rate and Rhythm: Normal rate and regular rhythm.      Pulses: Normal pulses.      Heart sounds: Normal heart sounds.   Pulmonary:      Effort: Pulmonary effort is normal.      Breath sounds: Normal breath sounds.   Musculoskeletal:      Right shoulder: Tenderness present. No swelling, deformity, effusion, bony tenderness or crepitus. Decreased range of motion. Normal strength. Normal pulse.      Left shoulder: Normal.      Comments: Negative empty can test  Negative Apprehension test  Negative lift off     Skin:     General: Skin is warm and dry.   Neurological:      General: No focal deficit present.      Mental Status: She is alert and oriented to person, place, and time.             Results      Assessment & Plan:     1. Acute pain of right shoulder  Referral to Orthopedics      2. Hypothyroidism due to acquired atrophy of thyroid        3. Mixed hyperlipidemia            Assessment & Plan  1. Acute pain of right shoulder.  There are no red flag symptoms on assessment, but the patient is having burning and numbness in the right arm. We discussed options including imaging through our office, and the patient opts to present to [facility] for rapid evaluation since the pain has been worsening over 3 weeks.    2. Hypothyroidism.  The patient completed updated labs this a.m., so we are waiting for her updated TSH. Last TSH and T4 indicated that she was on too high a dose of thyroid medication. She states she has been having increased difficulty with weight loss. She is currently taking levothyroxine 75 mcg by mouth daily, which she will continue until we get results.    3. Hyperlipidemia.  The patient continues the Crestor 10 mg by mouth daily without side effects.    4. Foot Swelling  Use compression stockings, monitor for further  swelling.    Follow-up  The patient will follow up in 1 month.    HCC Gap Form    Last edited 03/28/24 12:51 PDT by Collin Gaona, A.P.R.PELON.           I have placed the below orders and discussed them with an approved delegating provider. The MA is performing the below orders under the direction of Dr. Bales.      Return in about 1 month (around 4/28/2024), or if symptoms worsen or fail to improve.    Please note that this dictation was created using voice recognition software. I have made every reasonable attempt to correct obvious errors, but I expect that there are errors of grammar and possibly content that I did not discover before finalizing the note.

## 2024-05-06 ENCOUNTER — OFFICE VISIT (OUTPATIENT)
Dept: MEDICAL GROUP | Facility: PHYSICIAN GROUP | Age: 69
End: 2024-05-06
Payer: MEDICARE

## 2024-05-06 VITALS
OXYGEN SATURATION: 95 % | HEART RATE: 88 BPM | WEIGHT: 194.4 LBS | HEIGHT: 65 IN | SYSTOLIC BLOOD PRESSURE: 124 MMHG | TEMPERATURE: 97.3 F | DIASTOLIC BLOOD PRESSURE: 68 MMHG | BODY MASS INDEX: 32.39 KG/M2

## 2024-05-06 DIAGNOSIS — F41.9 ANXIETY: ICD-10-CM

## 2024-05-06 DIAGNOSIS — R73.09 ELEVATED HEMOGLOBIN A1C: ICD-10-CM

## 2024-05-06 DIAGNOSIS — E03.4 HYPOTHYROIDISM DUE TO ACQUIRED ATROPHY OF THYROID: ICD-10-CM

## 2024-05-06 DIAGNOSIS — R73.09 ELEVATED GLUCOSE: ICD-10-CM

## 2024-05-06 DIAGNOSIS — M25.511 ACUTE PAIN OF RIGHT SHOULDER: ICD-10-CM

## 2024-05-06 LAB
HBA1C MFR BLD: 5.6 % (ref ?–5.8)
POCT INT CON NEG: NEGATIVE
POCT INT CON POS: POSITIVE

## 2024-05-06 PROCEDURE — 3078F DIAST BP <80 MM HG: CPT | Performed by: NURSE PRACTITIONER

## 2024-05-06 PROCEDURE — 99214 OFFICE O/P EST MOD 30 MIN: CPT | Performed by: NURSE PRACTITIONER

## 2024-05-06 PROCEDURE — 3074F SYST BP LT 130 MM HG: CPT | Performed by: NURSE PRACTITIONER

## 2024-05-06 PROCEDURE — 83036 HEMOGLOBIN GLYCOSYLATED A1C: CPT | Performed by: NURSE PRACTITIONER

## 2024-05-06 RX ORDER — LEVOTHYROXINE SODIUM 88 UG/1
88 TABLET ORAL
Qty: 90 TABLET | Refills: 0 | Status: SHIPPED | OUTPATIENT
Start: 2024-05-06

## 2024-05-06 ASSESSMENT — FIBROSIS 4 INDEX: FIB4 SCORE: 1.55

## 2024-05-06 NOTE — PROGRESS NOTES
"Verbal consent was acquired by the patient to use [employer] ambient listening note generation during this visit NO, completed outside the room    Subjective:     HPI:   Kim is a 68 y.o.female established patient who presents today for:    History of Present Illness  The patient is a 68-year-old female patient here today to discuss lab results.    The patient's laboratory results were thoroughly discussed. Her concerns include a glucose level of 101 and a hemoglobin A1c level of 5.6, a marked increase from her previous A1c levels. Her ALT levels are slightly elevated at 55, but the patient reports that she has been consuming a significant amount of alcohol, resulting in a 5-point increase. Her cholesterol levels are generally within normal limits. She reports that she is on a daily regimen of rosuvastatin 10 mg, which she reports as being effective without any adverse effects.    The patient's hypothyroidism is chronic and stable. Her Synthroid dosage was reduced to 75 mcg in 06/2024 due to an elevated TSH and free T4 levels. Today, her TSH level is 3.230, which is outside the ideal range of 1 to 2. The patient reports increased fatigue and difficulty staying awake. The patient is currently on a regimen of levothyroxine 75 mcg orally daily in the morning on an empty stomach, with a small amount of blood and protein.    The patient reports an exacerbation of her anxiety symptoms due to increased stress. She describes herself as becoming increasingly irritable and \"_internal_fixQuotes_\" \"snappy.\" She was previously prescribed sertraline 25 mg, which she found significantly beneficial, and she expresses a desire to restart this medication at this time.    The patient reported acute pain in her right shoulder at her last appointment. She sought treatment at [facility], where an MRI was recommended, which she has yet to complete. She reports that her symptoms have been improving with exercises. However, she does " experience some pain with range of motion, specifically when abducting the arm above her head. However, she notes a steady improvement.     has a past medical history of History of malignant melanoma of skin (3/30/2018), Hyperlipidemia, and Hypothyroidism.    She has no past medical history of Addisons disease ([facility]), Adrenal disorder (HCC), Anemia, Arrhythmia, Arthritis, Asthma, Blood transfusion without reported diagnosis, Cancer ([facility]), Cataract, CHF (congestive heart failure) ([facility]), Clotting disorder (HCC), COPD (chronic obstructive pulmonary disease) (HCC), Cushings syndrome (HCC), Diabetes (HCC), Diabetic neuropathy (HCC), GERD (gastroesophageal reflux disease), Glaucoma, Goiter, Head ache, Heart attack (HCC), Heart murmur, HIV (human immunodeficiency virus infection) ([facility]), Hypertension, IBD (inflammatory bowel disease), Kidney disease, Meningitis, Migraine, Muscle disorder, Osteoporosis, Parathyroid disorder (HCC), Pituitary disease (HCC), Pulmonary emphysema (HCC), Seizure ([facility]), Sickle cell disease (HCC), Stroke ([facility]), Substance abuse ([facility]), Tuberculosis, or Urinary tract infection.   has a past surgical history that includes biopsy general.    Family History   Problem Relation Age of Onset    Heart Disease Mother         CHF    Thyroid Mother     Seizures Mother     Cancer Father         skin    Heart Disease Father 54        MI       Social History     Socioeconomic History    Marital status:      Spouse name: Not on file    Number of children: Not on file    Years of education: Not on file    Highest education level: Not on file   Occupational History    Not on file   Tobacco Use    Smoking status: Never    Smokeless tobacco: Never   Vaping Use    Vaping Use: Never used   Substance and Sexual Activity    Alcohol use: Yes     Alcohol/week: 1.8 oz     Types: 3 Standard drinks or equivalent per week     Comment: occasional    Drug use: Not Currently     Types: Marijuana    Sexual activity: Not  "Currently   Other Topics Concern    Not on file   Social History Narrative    Not on file     Social Determinants of Health     Financial Resource Strain: Not on file   Food Insecurity: Not on file   Transportation Needs: Not on file   Physical Activity: Not on file   Stress: Not on file   Social Connections: Not on file   Intimate Partner Violence: Not on file   Housing Stability: Not on file       Patient Active Problem List    Diagnosis Date Noted    Acute pain of right shoulder 03/28/2024    Suprapubic tenderness 01/04/2024    Right ear pain 12/13/2023    Mixed stress and urge urinary incontinence 06/20/2023    Environmental and seasonal allergies 04/11/2023    History of malignant melanoma of skin 03/30/2018    Obesity (BMI 30-39.9) 03/08/2018    Hypothyroidism due to acquired atrophy of thyroid 03/08/2018    Mixed hyperlipidemia 03/08/2018    Anxiety 03/08/2018         Current Outpatient Medications Ordered in Epic   Medication Sig Dispense Refill    levothyroxine (SYNTHROID) 88 MCG Tab Take 1 Tablet by mouth every morning on an empty stomach. 90 Tablet 0    sertraline (ZOLOFT) 50 MG Tab Take 1 Tablet by mouth every day. 30 Tablet 11    fluorouracil (EFUDEX) 5 % cream AAA, arms and backs of hand, twice a day for 2-4 weeks until red, robust reaction, then STOP. Start this in fall or winter, see me 6 weeks after stopping 40 g 2    benzonatate (TESSALON) 100 MG Cap Take 1 Capsule by mouth 3 times a day as needed for Cough. 60 Capsule 3    rosuvastatin (CRESTOR) 10 MG Tab Take 1 Tablet by mouth every evening. 90 Tablet 3     No current Epic-ordered facility-administered medications on file.     Allergies   Allergen Reactions    Grass Pollen(K-O-R-T-Swt Robby)        Health Maintenance: Completed    Objective:     Exam:  /68 (BP Location: Left arm, Patient Position: Sitting, BP Cuff Size: Adult)   Pulse 88   Temp 36.3 °C (97.3 °F) (Temporal)   Ht 1.651 m (5' 5\")   Wt 88.2 kg (194 lb 6.4 oz)   LMP  (LMP " Unknown)   SpO2 95%   BMI 32.35 kg/m²  Body mass index is 32.35 kg/m².    Physical Exam  HENT:      Head: Normocephalic and atraumatic.   Cardiovascular:      Rate and Rhythm: Normal rate and regular rhythm.      Pulses: Normal pulses.      Heart sounds: Normal heart sounds.   Pulmonary:      Effort: Pulmonary effort is normal.      Breath sounds: Normal breath sounds.   Skin:     General: Skin is warm.      Capillary Refill: Capillary refill takes less than 2 seconds.   Neurological:      General: No focal deficit present.      Mental Status: She is alert and oriented to person, place, and time.             Results  Laboratory Studies  Glucose is 101. Hemoglobin A1c is 5.6. ALT is slightly elevated at 55. Cholesterol is overall within normal limits. TSH is 3.230.    Assessment & Plan:     1. Elevated glucose  POCT  A1C      2. Hypothyroidism due to acquired atrophy of thyroid  levothyroxine (SYNTHROID) 88 MCG Tab    TSH    FREE THYROXINE      3. Anxiety  sertraline (ZOLOFT) 50 MG Tab      4. Acute pain of right shoulder        5. Elevated hemoglobin A1c  HEMOGLOBIN A1C          Assessment & Plan  1. Elevated glucose.  The patient was counseled on the importance of maintaining a healthy diet and regular exercise regimen.    2. Hypothyroidism.  The patient's hypothyroidism is chronic and stable. Her TSH levels are slightly elevated. The plan is to increase her levothyroxine dosage to 88 mcg orally daily, to be taken on an empty stomach. Repeat labs will be conducted in 3 months.    3. Anxiety.  The patient will commence a daily regimen of sertraline 50 mg orally. The risks, benefits, and side effects were thoroughly discussed.    4. Acute pain of right shoulder.  The patient will persist with the follow-up appointments with the [facility] as needed. She will also continue with her home exercises.    5. Elevated hemoglobin A1c.  The patient's hemoglobin A1c is 5.6. She has a strong family history of  diabetes and has been experiencing increased thirst. Her hemoglobin A1c levels will be repeated in 3 months.    Follow-up  The patient is scheduled for a follow-up visit in 3 months.    I spent a total of 30 minutes with record review, exam, communication with the patient, communication with other providers, and documentation of this encounter.    I have placed the below orders and discussed them with an approved delegating provider. The MA is performing the below orders under the direction of Dr. Bales.      Return in about 3 months (around 8/6/2024) for hypothyroid.    Please note that this dictation was created using voice recognition software. I have made every reasonable attempt to correct obvious errors, but I expect that there are errors of grammar and possibly content that I did not discover before finalizing the note.

## 2024-05-20 ENCOUNTER — OFFICE VISIT (OUTPATIENT)
Dept: MEDICAL GROUP | Facility: PHYSICIAN GROUP | Age: 69
End: 2024-05-20
Payer: MEDICARE

## 2024-05-20 VITALS
WEIGHT: 191.8 LBS | HEIGHT: 65 IN | SYSTOLIC BLOOD PRESSURE: 114 MMHG | BODY MASS INDEX: 31.96 KG/M2 | TEMPERATURE: 98.9 F | DIASTOLIC BLOOD PRESSURE: 60 MMHG | HEART RATE: 74 BPM | OXYGEN SATURATION: 96 %

## 2024-05-20 DIAGNOSIS — M25.511 ACUTE PAIN OF RIGHT SHOULDER: ICD-10-CM

## 2024-05-20 DIAGNOSIS — R05.1 ACUTE COUGH: ICD-10-CM

## 2024-05-20 DIAGNOSIS — M54.12 CERVICAL RADICULOPATHY: ICD-10-CM

## 2024-05-20 DIAGNOSIS — J40 BRONCHITIS: ICD-10-CM

## 2024-05-20 PROBLEM — H92.01 RIGHT EAR PAIN: Status: RESOLVED | Noted: 2023-12-13 | Resolved: 2024-05-20

## 2024-05-20 LAB
FLUAV RNA SPEC QL NAA+PROBE: NEGATIVE
FLUBV RNA SPEC QL NAA+PROBE: NEGATIVE
RSV RNA SPEC QL NAA+PROBE: NEGATIVE
SARS-COV-2 RNA RESP QL NAA+PROBE: NEGATIVE

## 2024-05-20 PROCEDURE — 3074F SYST BP LT 130 MM HG: CPT | Performed by: NURSE PRACTITIONER

## 2024-05-20 PROCEDURE — 99214 OFFICE O/P EST MOD 30 MIN: CPT | Performed by: NURSE PRACTITIONER

## 2024-05-20 PROCEDURE — 0241U POCT CEPHEID COV-2, FLU A/B, RSV - PCR: CPT | Performed by: NURSE PRACTITIONER

## 2024-05-20 PROCEDURE — 3078F DIAST BP <80 MM HG: CPT | Performed by: NURSE PRACTITIONER

## 2024-05-20 RX ORDER — METHOCARBAMOL 750 MG/1
750 TABLET, FILM COATED ORAL EVERY 6 HOURS PRN
Qty: 30 TABLET | Refills: 0 | Status: SHIPPED | OUTPATIENT
Start: 2024-05-20 | End: 2024-05-30

## 2024-05-20 RX ORDER — AZITHROMYCIN 250 MG/1
TABLET, FILM COATED ORAL
Qty: 6 TABLET | Refills: 0 | Status: SHIPPED | OUTPATIENT
Start: 2024-05-20 | End: 2024-05-25

## 2024-05-20 RX ORDER — MELOXICAM 15 MG/1
15 TABLET ORAL
Qty: 30 TABLET | Refills: 0 | Status: SHIPPED | OUTPATIENT
Start: 2024-05-20 | End: 2024-06-19

## 2024-05-20 ASSESSMENT — FIBROSIS 4 INDEX: FIB4 SCORE: 1.55

## 2024-05-20 NOTE — PROGRESS NOTES
"Chief Complaint   Patient presents with    Cough     Congestion, fatigue. About 2 weeks ago         HPI: Patient is a 68 y.o. female complaining of 14 days of illness including: nonproductive cough, diarrhea, shortness of breath, rhinorrhea. Body aches  Mucus is: none.  Similarly ill exposures: no.  Treatments tried: albuterol not helpful, states primatene not helpful, mucinex, tylenol  She  reports that she has never smoked. She has never used smokeless tobacco..    Patient is continuing to have mild shoulder pain and states that overall it is healing but she has noticed some muscle spasming with overuse and is requesting a refill of muscle relaxer and anti-inflammatory provided to her by orthopedics.     ROS:  No fever, +cough, no nausea, +diarrhea (not conistent)changes in bowel movements or skin rash.      I reviewed the patient's medications, allergies and medical history:  Current Outpatient Medications   Medication Sig Dispense Refill    levothyroxine (SYNTHROID) 88 MCG Tab Take 1 Tablet by mouth every morning on an empty stomach. 90 Tablet 0    sertraline (ZOLOFT) 50 MG Tab Take 1 Tablet by mouth every day. 30 Tablet 11    fluorouracil (EFUDEX) 5 % cream AAA, arms and backs of hand, twice a day for 2-4 weeks until red, robust reaction, then STOP. Start this in fall or winter, see me 6 weeks after stopping 40 g 2    benzonatate (TESSALON) 100 MG Cap Take 1 Capsule by mouth 3 times a day as needed for Cough. 60 Capsule 3    rosuvastatin (CRESTOR) 10 MG Tab Take 1 Tablet by mouth every evening. 90 Tablet 3     No current facility-administered medications for this visit.     Grass pollen(k-[id number]-t-swt terri)  Past Medical History:   Diagnosis Date    History of malignant melanoma of skin 3/30/2018    Hyperlipidemia     Hypothyroidism         EXAM:  /60 (BP Location: Right arm, Patient Position: Sitting, BP Cuff Size: Adult)   Pulse 74   Temp 37.2 °C (98.9 °F) (Temporal)   Ht 1.651 m (5' 5\")   Wt 87 kg " (191 lb 12.8 oz)   SpO2 96%   General: Alert, no conversational dyspnea or audible wheeze, non-toxic appearance.  Eyes: PERRL, conjunctiva slightly injected, no eye discharge.  Ears: Normal pinnae,TM's bulging bilaterally.  Nares: Patent with thick mucus.  Sinuses: non tender over maxillary / frontal sinuses.  Throat: Erythematous injection without exudate.   Neck: Supple, with mildly enlarged cervical nodes.  Lungs: Clear to auscultation bilaterally, no wheeze, crackles or rhonchi.   Heart: Regular rate without murmur.  Skin: Warm and dry without rash.     ASSESSMENT:   1. Acute cough          PLAN:  1. Educated patient that majority of upper respiratory infections are viral and do not need antibiotics. As symptoms have been worsening over the last week, will treat with antibiotics. Start azithromycin 500 mg on day 1 then 250 mg day 2-5.  In office point-of-care testing for influenza a and B, COVID-19, RSV are negative  2. Twice daily use of nasal saline rinse or Neti-Pot.  3. OTC anti-pyretics and decongestants as needed.  4. Follow-up in office or urgent care for worsening symptoms, difficulty breathing, lack of expected recovery, or should new symptoms or problems arise.  5.  Refill of meloxicam 15 mg p.o. daily and Robaxin 750 mg po as needed for muscle spasm.

## 2024-07-16 ENCOUNTER — TELEPHONE (OUTPATIENT)
Dept: HEALTH INFORMATION MANAGEMENT | Facility: OTHER | Age: 69
End: 2024-07-16

## 2024-07-30 DIAGNOSIS — E03.4 HYPOTHYROIDISM DUE TO ACQUIRED ATROPHY OF THYROID: ICD-10-CM

## 2024-07-30 RX ORDER — LEVOTHYROXINE SODIUM 88 UG/1
88 TABLET ORAL
Qty: 90 TABLET | Refills: 0 | Status: SHIPPED | OUTPATIENT
Start: 2024-07-30

## 2024-08-22 ENCOUNTER — APPOINTMENT (OUTPATIENT)
Dept: MEDICAL GROUP | Facility: PHYSICIAN GROUP | Age: 69
End: 2024-08-22
Payer: MEDICARE

## 2024-08-26 ENCOUNTER — OFFICE VISIT (OUTPATIENT)
Dept: MEDICAL GROUP | Facility: PHYSICIAN GROUP | Age: 69
End: 2024-08-26
Payer: MEDICARE

## 2024-08-26 VITALS
HEART RATE: 73 BPM | OXYGEN SATURATION: 97 % | WEIGHT: 195 LBS | SYSTOLIC BLOOD PRESSURE: 126 MMHG | TEMPERATURE: 97.3 F | HEIGHT: 65 IN | BODY MASS INDEX: 32.49 KG/M2 | DIASTOLIC BLOOD PRESSURE: 84 MMHG

## 2024-08-26 DIAGNOSIS — L98.9 SKIN LESION OF LEFT ARM: ICD-10-CM

## 2024-08-26 DIAGNOSIS — Z78.0 POST-MENOPAUSAL: ICD-10-CM

## 2024-08-26 DIAGNOSIS — G89.29 CHRONIC PAIN OF RIGHT KNEE: ICD-10-CM

## 2024-08-26 DIAGNOSIS — N39.46 MIXED STRESS AND URGE URINARY INCONTINENCE: ICD-10-CM

## 2024-08-26 DIAGNOSIS — Z12.31 ENCOUNTER FOR SCREENING MAMMOGRAM FOR MALIGNANT NEOPLASM OF BREAST: ICD-10-CM

## 2024-08-26 DIAGNOSIS — M25.561 CHRONIC PAIN OF RIGHT KNEE: ICD-10-CM

## 2024-08-26 DIAGNOSIS — R82.90 BAD ODOR OF URINE: ICD-10-CM

## 2024-08-26 PROCEDURE — 99214 OFFICE O/P EST MOD 30 MIN: CPT | Performed by: NURSE PRACTITIONER

## 2024-08-26 PROCEDURE — 3079F DIAST BP 80-89 MM HG: CPT | Performed by: NURSE PRACTITIONER

## 2024-08-26 PROCEDURE — 3074F SYST BP LT 130 MM HG: CPT | Performed by: NURSE PRACTITIONER

## 2024-08-26 NOTE — PROGRESS NOTES
Verbal consent was acquired by the patient to use [employer] ambient listening note generation during this visit yes    Subjective:     HPI:   Kim is a 69 y.o.female established patient who presents today for:    History of Present Illness  The patient presents for evaluation of multiple medical concerns.    She has noticed a mole on her left arm that is oblong and irregular shape and multicolored, which she discovered 3 to 4 weeks ago. She admits to having scratched it heavily on a few occasions.    She has been experiencing issues with her right knee since a fall in March 2024. Despite not having an MRI, she reports occasional sharp pain in the knee, which appears swollen. The pain is present when ascending or descending stairs. She also mentions hearing a cracking sound from her knee at times. Her pain has improved with the use of Tylenol and ibuprofen. She is considering platelet-rich plasma (PRP) injections as a potential treatment.    She has been taking meloxicam and noticed an ammonia-like odor in her urine, which has since resolved after discontinuing the medication. She is curious if this could be a side effect of the drug.    She has been taking over-the-counter Tylenol for shoulder pain. She has made dietary changes, including increased fish intake, in an effort to lose weight. She experiences occasional incontinence, particularly when traveling, and uses pads for management. She has never given birth.    She is due for her mammogram.    IMMUNIZATIONS  She has had pneumonia vaccine before.     has a past medical history of History of malignant melanoma of skin (3/30/2018), Hyperlipidemia, and Hypothyroidism.    She has no past medical history of Addisons disease ([facility]), Adrenal disorder ([facility]), Anemia, Arrhythmia, Arthritis, Asthma, Blood transfusion without reported diagnosis, Cancer (HCC), Cataract, CHF (congestive heart failure) ([facility]), Clotting disorder ([facility]), COPD (chronic obstructive pulmonary  disease) (HCC), Cushings syndrome (HCC), Diabetes (HCC), Diabetic neuropathy (HCC), GERD (gastroesophageal reflux disease), Glaucoma, Goiter, Head ache, Heart attack (HCC), Heart murmur, HIV (human immunodeficiency virus infection) (HCC), Hypertension, IBD (inflammatory bowel disease), Kidney disease, Meningitis, Migraine, Muscle disorder, Osteoporosis, Parathyroid disorder (HCC), Pituitary disease (HCC), Pulmonary emphysema (HCC), Seizure (HCC), Sickle cell disease (HCC), Stroke (HCC), Substance abuse (HCC), Tuberculosis, or Urinary tract infection.   has a past surgical history that includes biopsy general.    Family History   Problem Relation Age of Onset    Heart Disease Mother         CHF    Thyroid Mother     Seizures Mother     Cancer Father         skin    Heart Disease Father 54        MI       Social History     Socioeconomic History    Marital status:      Spouse name: Not on file    Number of children: Not on file    Years of education: Not on file    Highest education level: Not on file   Occupational History    Not on file   Tobacco Use    Smoking status: Never    Smokeless tobacco: Never   Vaping Use    Vaping status: Never Used   Substance and Sexual Activity    Alcohol use: Not Currently     Alcohol/week: 1.8 oz     Types: 3 Standard drinks or equivalent per week     Comment: occasional    Drug use: Not Currently     Types: Marijuana    Sexual activity: Not Currently   Other Topics Concern    Not on file   Social History Narrative    Not on file     Social Determinants of Health     Financial Resource Strain: Not on file   Food Insecurity: Not on file   Transportation Needs: Not on file   Physical Activity: Not on file   Stress: Not on file   Social Connections: Not on file   Intimate Partner Violence: Not on file   Housing Stability: Not on file       Patient Active Problem List    Diagnosis Date Noted    Chronic pain of right knee 08/26/2024    Chronic right shoulder pain 03/28/2024     "Suprapubic tenderness 01/04/2024    Mixed stress and urge urinary incontinence 06/20/2023    Environmental and seasonal allergies 04/11/2023    History of malignant melanoma of skin 03/30/2018    Obesity (BMI 30-39.9) 03/08/2018    Hypothyroidism due to acquired atrophy of thyroid 03/08/2018    Mixed hyperlipidemia 03/08/2018    Anxiety 03/08/2018         Current Outpatient Medications Ordered in Epic   Medication Sig Dispense Refill    levothyroxine (SYNTHROID) 88 MCG Tab TAKE ONE TABLET BY MOUTH EVERY MORNING ON AN EMPTY STOMACH 90 Tablet 0    fluorouracil (EFUDEX) 5 % cream AAA, arms and backs of hand, twice a day for 2-4 weeks until red, robust reaction, then STOP. Start this in fall or winter, see me 6 weeks after stopping 40 g 2    benzonatate (TESSALON) 100 MG Cap Take 1 Capsule by mouth 3 times a day as needed for Cough. 60 Capsule 3    rosuvastatin (CRESTOR) 10 MG Tab Take 1 Tablet by mouth every evening. 90 Tablet 3     No current Epic-ordered facility-administered medications on file.     Allergies   Allergen Reactions    Grass Pollen(K-O-R-T-Swt Robby)        Health Maintenance: Completed    Objective:     Exam:  /84   Pulse 73   Temp 36.3 °C (97.3 °F) (Temporal)   Ht 1.651 m (5' 5\")   Wt 88.5 kg (195 lb)   LMP  (LMP Unknown)   SpO2 97%   BMI 32.45 kg/m²  Body mass index is 32.45 kg/m².    Physical Exam  Constitutional:       Appearance: Normal appearance.   HENT:      Head: Normocephalic and atraumatic.   Cardiovascular:      Rate and Rhythm: Normal rate and regular rhythm.      Pulses: Normal pulses.   Pulmonary:      Effort: Pulmonary effort is normal.      Breath sounds: Normal breath sounds.   Musculoskeletal:      Right knee: Swelling and effusion present. No deformity. Normal range of motion. No tenderness. No LCL laxity, MCL laxity, ACL laxity or PCL laxity. Normal patellar mobility.   Skin:     General: Skin is warm and dry.   Neurological:      General: No focal deficit present.     "  Mental Status: She is alert and oriented to person, place, and time.           Results      Assessment & Plan:     1. Skin lesion of left arm  Referral to Dermatology      2. Encounter for screening mammogram for malignant neoplasm of breast  MA-SCREENING MAMMO BILAT W/TOMOSYNTHESIS W/CAD      3. Chronic pain of right knee  Referral to Orthopedics      4. Bad odor of urine  Comp Metabolic Panel    URINALYSIS      5. Mixed stress and urge urinary incontinence        6. Post-menopausal  DS-BONE DENSITY STUDY (DEXA)          Assessment & Plan  1. Mole on back.  The mole is oblong in shape and multicolored, warranting further examination. A referral to dermatology has been made for a comprehensive skin assessment and potential biopsy of the mole.    2. Right knee pain.  She has been experiencing sharp pain and puffiness in her right knee since a fall in March. A referral to orthopedics has been made for further evaluation and consideration of PRP injections.    3. Ammonia-like odor in urine.  Orders have been placed for a metabolic panel, liver function tests, kidney function tests, and urinalysis. She has been advised to undergo these tests if the urine odor persists.    4.  Stress and urge incontinence.  She experiences occasional incontinence, especially during travel, and uses pads as a management strategy. No surgical intervention is desired at this time.    5. Health Maintenance.  Orders have been placed for a mammogram and bone density scan. She has declined the tetanus vaccine at this time. An annual wellness visit has been scheduled for February 2025.          I have placed the below orders and discussed them with an approved delegating provider. The MA is performing the below orders under the direction of Dr. Bales.      Return in about 6 months (around 2/26/2025) for AWV.    Please note that this dictation was created using voice recognition software. I have made every reasonable attempt to correct obvious  errors, but I expect that there are errors of grammar and possibly content that I did not discover before finalizing the note.

## 2024-09-10 ENCOUNTER — OFFICE VISIT (OUTPATIENT)
Dept: DERMATOLOGY | Facility: IMAGING CENTER | Age: 69
End: 2024-09-10
Payer: MEDICARE

## 2024-09-10 DIAGNOSIS — L82.1 SK (SEBORRHEIC KERATOSIS): ICD-10-CM

## 2024-09-10 DIAGNOSIS — Z12.83 SKIN CANCER SCREENING: ICD-10-CM

## 2024-09-10 DIAGNOSIS — D22.9 MULTIPLE NEVI: ICD-10-CM

## 2024-09-10 DIAGNOSIS — L57.0 ACTINIC KERATOSIS: ICD-10-CM

## 2024-09-10 DIAGNOSIS — L82.0 INFLAMED SEBORRHEIC KERATOSIS: ICD-10-CM

## 2024-09-10 DIAGNOSIS — D48.9 NEOPLASM OF UNCERTAIN BEHAVIOR: ICD-10-CM

## 2024-09-10 DIAGNOSIS — L81.4 LENTIGINES: ICD-10-CM

## 2024-09-10 DIAGNOSIS — D18.01 CHERRY ANGIOMA: ICD-10-CM

## 2024-09-10 PROCEDURE — 99213 OFFICE O/P EST LOW 20 MIN: CPT | Performed by: NURSE PRACTITIONER

## 2024-09-10 RX ORDER — FLUOROURACIL 50 MG/G
CREAM TOPICAL
Qty: 40 G | Refills: 2 | Status: SHIPPED | OUTPATIENT
Start: 2024-09-10

## 2024-09-10 NOTE — PROGRESS NOTES
DERMATOLOGY NOTE  FOLLOW UP VISIT       Chief complaint: Establish Care     HPI:  skin lesion   Location: left upper arm   Time present: 6 weeks   Painful lesion: No  Itching lesion: No  Enlarging lesion: Yes  Anything make it better or worse?no           History of skin cancer: Yes, Details: melanoma  right cheek 11 years ago, had 3 surgeries, finally went to plastics--resolved, not sure if was MOHS   History of precancers/actinic keratoses: Yes, Details:    History of biopsies:Yes, Details:    History of blistering/severe sunburns:Yes, Details:    Family history of skin cancer: father , type unknown  , bother BCC   Family history of atypical moles:No      Allergies   Allergen Reactions    Grass Pollen(K-O-R-T-Swt Robby)         MEDICATIONS:  Medications relevant to specialty reviewed.     REVIEW OF SYSTEMS:   Positive for skin (see HPI)  Negative for fevers and chills       EXAM:  LMP  (LMP Unknown)   Constitutional: Well-developed, well-nourished, and in no distress.     A total body skin exam was performed excluding the genitals per patient preference and including the following areas: head (including face), neck, chest, abdomen, groin/buttocks, back, bilateral upper extremities, and bilateral lower extremities with the following pertinent findings listed below and/or in assessment/plan.     -sun exposed skin of trunk and b/l upper, lower extremities and face with scattered clinically benign light brown reticulated macules all of which were morphologically similar and none of which were suspicious for skin cancer today on exam  Multiple tan medium brown skin-colored macules papules scattered over the trunk >> extremities--All with benign-appearing pigment network patterns on dermoscopy  Several tan medium brown skin-colored stuck-on waxy papules scattered on the trunk and extremities  Several scattered 1-3mm bright red macules and thin papules on the trunk and extremities  Stucco keratosis bilateral extremity    Approx. 3-4 mm flesh colored papule to R nasal sidewall very near R inner canthus  Inflamed tan waxy stuck on appearing papule to LUE    IMPRESSION / PLAN:    1. Actinic keratosis  Pt requesting refill on Rx, will do field tx in fall/winter  - fluorouracil (EFUDEX) 5 % cream; AAA, arms and backs of hand, twice a day for 2-4 weeks until red, robust reaction, then STOP. Start this in fall or winter, see me 6 weeks after stopping  Dispense: 40 g; Refill: 2    2. Neoplasm of uncertain behavior  Pt would like lesion removed, due to location, will refer  - Referral to Ophthalmology    3. Inflamed seborrheic keratosis  - Benign-appearing nature of lesions discussed during exam.   - recommend LN2, but pt will return at later date, this is reasonable  - Advised to continue to monitor for any return to clinic for new or concerning changes.      4. Lentigines  - Benign-appearing nature of lesions discussed during exam.   - Advised to continue to monitor for any return to clinic for new or concerning changes.      5. Cherry angioma  - Benign-appearing nature of lesions discussed during exam.   - Advised to continue to monitor for any return to clinic for new or concerning changes.      6. SK (seborrheic keratosis)  - Benign-appearing nature of lesions discussed during exam.   - Advised to continue to monitor for any return to clinic for new or concerning changes.      7. Multiple nevi  - Benign-appearing nature of lesions discussed during exam.   - Advised to continue to monitor for any return to clinic for new or concerning changes.  - ABCDE's of melanoma discussed/handout given      8. Skin cancer screening  Skin cancer education  discussed importance of sun protective clothing, eyewear in addition to the use of broad spectrum sunscreen with SPF 30 or greater, as well as need for reapplication ~every 2 hours when exposed to UVR/handout given  discussed importance following up for any new or changing lesions as noted in  handout given, but every 12 months exams in clinic in the setting of dermatologic history  ABCDE's of melanoma discussed/handout given          Please note that this dictation was created using voice recognition software. I have made every reasonable attempt to correct obvious errors, but I expect that there are errors of grammar and possibly content that I did not discover before finalizing the note.      Return to clinic in: No follow-ups on file. and as needed for any new or changing skin lesions.

## 2024-09-12 DIAGNOSIS — E78.2 MIXED HYPERLIPIDEMIA: ICD-10-CM

## 2024-09-12 RX ORDER — ROSUVASTATIN CALCIUM 10 MG/1
10 TABLET, COATED ORAL EVERY EVENING
Qty: 90 TABLET | Refills: 3 | Status: SHIPPED | OUTPATIENT
Start: 2024-09-12

## 2024-09-12 NOTE — TELEPHONE ENCOUNTER
Received request via: Pharmacy    Was the patient seen in the last year in this department? Yes    Does the patient have an active prescription (recently filled or refills available) for medication(s) requested? No    Pharmacy Name:  [id number] - [place], [place] - [address]     Does the patient have nursing home Plus and need 100-day supply? (This applies to ALL medications) Yes, quantity updated to 100 days

## 2024-10-08 ENCOUNTER — HOSPITAL ENCOUNTER (OUTPATIENT)
Dept: RADIOLOGY | Facility: MEDICAL CENTER | Age: 69
End: 2024-10-08
Attending: NURSE PRACTITIONER
Payer: MEDICARE

## 2024-10-08 DIAGNOSIS — Z78.0 POST-MENOPAUSAL: ICD-10-CM

## 2024-10-08 DIAGNOSIS — Z12.31 ENCOUNTER FOR SCREENING MAMMOGRAM FOR MALIGNANT NEOPLASM OF BREAST: ICD-10-CM

## 2024-10-08 PROCEDURE — 77080 DXA BONE DENSITY AXIAL: CPT

## 2024-10-08 PROCEDURE — 77067 SCR MAMMO BI INCL CAD: CPT

## 2024-11-04 DIAGNOSIS — E03.4 HYPOTHYROIDISM DUE TO ACQUIRED ATROPHY OF THYROID: ICD-10-CM

## 2024-11-04 RX ORDER — LEVOTHYROXINE SODIUM 88 UG/1
88 TABLET ORAL
Qty: 90 TABLET | Refills: 0 | Status: SHIPPED | OUTPATIENT
Start: 2024-11-04

## 2024-11-04 NOTE — TELEPHONE ENCOUNTER
Received request via: Pharmacy    Was the patient seen in the last year in this department? Yes    Does the patient have an active prescription (recently filled or refills available) for medication(s) requested? No    Pharmacy Name: [facility]     Does the patient have [facility] Plus and need 100-day supply? (This applies to ALL medications) Yes, quantity updated to 100 days

## 2025-01-28 ENCOUNTER — OFFICE VISIT (OUTPATIENT)
Dept: URGENT CARE | Facility: CLINIC | Age: 70
End: 2025-01-28
Payer: MEDICARE

## 2025-01-28 VITALS
HEIGHT: 66 IN | BODY MASS INDEX: 30.86 KG/M2 | WEIGHT: 192 LBS | RESPIRATION RATE: 18 BRPM | TEMPERATURE: 97.4 F | HEART RATE: 95 BPM | SYSTOLIC BLOOD PRESSURE: 122 MMHG | OXYGEN SATURATION: 88 % | DIASTOLIC BLOOD PRESSURE: 78 MMHG

## 2025-01-28 DIAGNOSIS — R09.81 COUGH WITH CONGESTION OF PARANASAL SINUS: ICD-10-CM

## 2025-01-28 DIAGNOSIS — R05.8 COUGH WITH CONGESTION OF PARANASAL SINUS: ICD-10-CM

## 2025-01-28 DIAGNOSIS — R05.1 ACUTE COUGH: ICD-10-CM

## 2025-01-28 DIAGNOSIS — M79.10 MYALGIA: ICD-10-CM

## 2025-01-28 DIAGNOSIS — J01.10 ACUTE NON-RECURRENT FRONTAL SINUSITIS: ICD-10-CM

## 2025-01-28 PROCEDURE — 3074F SYST BP LT 130 MM HG: CPT

## 2025-01-28 PROCEDURE — 99214 OFFICE O/P EST MOD 30 MIN: CPT

## 2025-01-28 PROCEDURE — 3078F DIAST BP <80 MM HG: CPT

## 2025-01-28 RX ORDER — BENZONATATE 100 MG/1
100 CAPSULE ORAL 3 TIMES DAILY PRN
Qty: 60 CAPSULE | Refills: 0 | Status: SHIPPED | OUTPATIENT
Start: 2025-01-28

## 2025-01-28 RX ORDER — AZITHROMYCIN 250 MG/1
250 TABLET, FILM COATED ORAL DAILY
Qty: 5 TABLET | Refills: 0 | Status: SHIPPED | OUTPATIENT
Start: 2025-01-28 | End: 2025-02-02

## 2025-01-28 RX ORDER — GUAIFENESIN 600 MG/1
600 TABLET, EXTENDED RELEASE ORAL EVERY 12 HOURS
Qty: 28 TABLET | Refills: 0 | Status: SHIPPED | OUTPATIENT
Start: 2025-01-28 | End: 2025-02-11

## 2025-01-28 RX ORDER — CYCLOBENZAPRINE HCL 10 MG
10 TABLET ORAL NIGHTLY PRN
Qty: 5 TABLET | Refills: 0 | Status: SHIPPED | OUTPATIENT
Start: 2025-01-28 | End: 2025-02-02

## 2025-01-28 ASSESSMENT — ENCOUNTER SYMPTOMS
SINUS PAIN: 1
COUGH: 1
SORE THROAT: 1
FEVER: 0
WEAKNESS: 1
CHILLS: 1
MYALGIAS: 1
SPUTUM PRODUCTION: 1
LOSS OF CONSCIOUSNESS: 0
HEADACHES: 1
NAUSEA: 0
DIARRHEA: 0
SHORTNESS OF BREATH: 1
VOMITING: 0
PALPITATIONS: 0
DIZZINESS: 0
FALLS: 0
BLURRED VISION: 0
ABDOMINAL PAIN: 0
FOCAL WEAKNESS: 0

## 2025-01-28 NOTE — PROGRESS NOTES
Subjective:     Kim Mcginnis is a 69 y.o. female who presents for Congestion (Chest congestion , body aches and deep cough x 5 days )      Cough  This is a new problem. The current episode started in the past 7 days. The problem has been waxing and waning. The cough is Productive of brown sputum and productive of purulent sputum. Associated symptoms include chills, headaches, myalgias, nasal congestion, postnasal drip, a sore throat and shortness of breath. Pertinent negatives include no chest pain, fever or rash. The symptoms are aggravated by cold air. She has tried OTC cough suppressant and rest for the symptoms. The treatment provided no relief.       Review of Systems   Constitutional:  Positive for chills and malaise/fatigue. Negative for fever.   HENT:  Positive for congestion, postnasal drip, sinus pain and sore throat. Negative for ear discharge.    Eyes:  Negative for blurred vision.   Respiratory:  Positive for cough, sputum production and shortness of breath.    Cardiovascular:  Negative for chest pain, palpitations and leg swelling.   Gastrointestinal:  Negative for abdominal pain, diarrhea, nausea and vomiting.   Musculoskeletal:  Positive for myalgias. Negative for falls.   Skin:  Negative for itching and rash.   Neurological:  Positive for weakness and headaches. Negative for dizziness, focal weakness and loss of consciousness.        CURRENT MEDICATIONS:  benzonatate Caps  fluorouracil  levothyroxine Tabs  rosuvastatin Tabs    Allergies:     Allergies   Allergen Reactions    Grass Pollen(K-O-R-T-Swt Robby)        Current Problems: Kim Mcginnis does not have any pertinent problems on file.  Past Surgical Hx:    Past Surgical History:   Procedure Laterality Date    BIOPSY GENERAL      skin      Past Social Hx:  reports that she has never smoked. She has never been exposed to tobacco smoke. She has never used smokeless tobacco. She reports that she does not currently use alcohol after a  "past usage of about 1.8 oz of alcohol per week. She reports that she does not currently use drugs after having used the following drugs: Marijuana.   Past Family Hx:  Kim Mcginnis family history includes Cancer in her father; Heart Disease in her mother; Heart Disease (age of onset: 54) in her father; Seizures in her mother; Thyroid in her mother.     (Allergies, Medications, & Tobacco/Substance Use were reconciled by the Medical Assistant and reviewed by myself. The family history is prepopulated)       Objective:     /78 (BP Location: Left arm, Patient Position: Sitting, BP Cuff Size: Adult)   Pulse 95   Temp 36.3 °C (97.4 °F) (Temporal)   Resp 18   Ht 1.664 m (5' 5.5\")   Wt 87.1 kg (192 lb)   LMP  (LMP Unknown)   SpO2 88%   BMI 31.46 kg/m²     Physical Exam  Constitutional:       General: She is not in acute distress.     Appearance: Normal appearance. She is ill-appearing. She is not toxic-appearing or diaphoretic.   HENT:      Head: Normocephalic and atraumatic.      Nose: Congestion and rhinorrhea present.      Mouth/Throat:      Pharynx: Posterior oropharyngeal erythema present.   Eyes:      General: No scleral icterus.        Right eye: No discharge.         Left eye: No discharge.   Cardiovascular:      Rate and Rhythm: Normal rate and regular rhythm.      Heart sounds: Normal heart sounds. No murmur heard.     No friction rub. No gallop.   Pulmonary:      Effort: Pulmonary effort is normal. No respiratory distress.      Breath sounds: No stridor. No wheezing, rhonchi or rales.   Chest:      Chest wall: No tenderness.   Abdominal:      General: Abdomen is flat.      Palpations: Abdomen is soft.   Musculoskeletal:         General: Normal range of motion.      Cervical back: No rigidity.   Skin:     General: Skin is warm and dry.   Neurological:      General: No focal deficit present.      Mental Status: She is alert and oriented to person, place, and time. Mental status is at baseline. "   Psychiatric:         Behavior: Behavior normal.         Judgment: Judgment normal.       Assessment/Plan:   Kim was seen today for congestion.    Diagnoses and all orders for this visit:    Acute cough  -     Cancel: DX-CHEST-2 VIEWS; Future  -     benzonatate (TESSALON) 100 MG Cap; Take 1 Capsule by mouth 3 times a day as needed for Cough.    Cough with congestion of paranasal sinus  -     guaiFENesin ER (MUCINEX) 600 MG TABLET SR 12 HR; Take 1 Tablet by mouth every 12 hours for 14 days.    Myalgia  -     cyclobenzaprine (FLEXERIL) 10 mg Tab; Take 1 Tablet by mouth at bedtime as needed for Moderate Pain for up to 5 days.    Acute non-recurrent frontal sinusitis  -     azithromycin (ZITHROMAX) 250 MG Tab; Take 1 Tablet by mouth every day for 5 days.     Based on history of presenting illness, review of systems and physical exam findings, most likely etiology of cough, congestion, facial pain, headaches, myalgias is likely viral illness complicated by acute bacterial sinusitis.  Discussed symptomatic management with pharmacotherapy and over-the-counter medications.  On my exam I do see signs /symptoms consistent with a bacterial infection currently requiring oral antibiotics.  Discussed the risks the benefits and the indications of all new medications prescribed today.  Strict return and ED precautions were discussed and all questions were answered.     Differential diagnosis, natural history, supportive care, and indications for immediate follow-up discussed.    Advised the patient to follow-up with the primary care physician for recheck, reevaluation, and consideration of further management.    Please note that this dictation was created using voice recognition software. I have made reasonable attempt to correct obvious errors, but I expect that there are errors of grammar and possibly content that I did not discover before finalizing the note.    This note was electronically signed by Shanta Ellison MD  PhD

## 2025-02-02 ENCOUNTER — TELEPHONE (OUTPATIENT)
Dept: URGENT CARE | Facility: CLINIC | Age: 70
End: 2025-02-02
Payer: MEDICARE

## 2025-02-04 ENCOUNTER — OFFICE VISIT (OUTPATIENT)
Dept: MEDICAL GROUP | Facility: PHYSICIAN GROUP | Age: 70
End: 2025-02-04
Payer: MEDICARE

## 2025-02-04 VITALS
TEMPERATURE: 97.2 F | HEART RATE: 78 BPM | SYSTOLIC BLOOD PRESSURE: 110 MMHG | BODY MASS INDEX: 31.12 KG/M2 | OXYGEN SATURATION: 93 % | DIASTOLIC BLOOD PRESSURE: 70 MMHG | WEIGHT: 193.6 LBS | HEIGHT: 66 IN

## 2025-02-04 DIAGNOSIS — E66.9 OBESITY (BMI 30-39.9): ICD-10-CM

## 2025-02-04 DIAGNOSIS — R05.1 ACUTE COUGH: ICD-10-CM

## 2025-02-04 DIAGNOSIS — J18.9 PNEUMONIA OF BOTH LOWER LOBES DUE TO INFECTIOUS ORGANISM: ICD-10-CM

## 2025-02-04 PROCEDURE — 99213 OFFICE O/P EST LOW 20 MIN: CPT | Performed by: NURSE PRACTITIONER

## 2025-02-04 PROCEDURE — 3074F SYST BP LT 130 MM HG: CPT | Performed by: NURSE PRACTITIONER

## 2025-02-04 PROCEDURE — 0241U POCT CEPHEID COV-2, FLU A/B, RSV - PCR: CPT | Performed by: NURSE PRACTITIONER

## 2025-02-04 PROCEDURE — 3078F DIAST BP <80 MM HG: CPT | Performed by: NURSE PRACTITIONER

## 2025-02-04 RX ORDER — DOXYCYCLINE 100 MG/1
100 CAPSULE ORAL 2 TIMES DAILY
Qty: 14 CAPSULE | Refills: 0 | Status: SHIPPED | OUTPATIENT
Start: 2025-02-04 | End: 2025-02-11

## 2025-02-04 RX ORDER — ALBUTEROL SULFATE 90 UG/1
2 INHALANT RESPIRATORY (INHALATION) EVERY 4 HOURS PRN
Qty: 8.5 G | Refills: 0 | Status: SHIPPED | OUTPATIENT
Start: 2025-02-04

## 2025-02-04 ASSESSMENT — PATIENT HEALTH QUESTIONNAIRE - PHQ9: CLINICAL INTERPRETATION OF PHQ2 SCORE: 0

## 2025-02-04 NOTE — PROGRESS NOTES
Verbal consent was acquired by the patient to use [employer] ambient listening note generation during this visit yes    Subjective:     HPI:   History of Present Illness  The patient presents for evaluation of cough, congestion, dizziness, lightheadedness, sore throat, earache, achy legs and joints.    Pneumonia  She was previously evaluated at an urgent care facility last week after 7 days of symptoms, where she presented with a constellation of symptoms including congestion, dizziness, lightheadedness, sore throat, earache, cough, and generalized body aches. She reports no fever but has been experiencing persistent coughing, which produces sputum. She also reports a loss of taste, a swollen throat, and a feeling of malaise. She experiences spontaneous headaches and deep-seated mucus in her lungs, accompanied by shortness of breath. She has not experienced any fevers but reports waking up with a clammy feeling. She continues to take Mucinex and Tylenol daily, which provides some relief from her body aches. She was prescribed a muscle relaxer for nighttime use, which aids in sleep but does not alleviate her pain. She describes a sensation of wheezing when lying down to sleep. She recalls a previous episode where she felt faint and considered seeking emergency care. Her oxygen saturation was recorded as 88 at the urgent care facility. She suspects she may have bronchitis or walking pneumonia due to the presence of yellow mucus with dry blood from her nose and blood-tinged sputum. She notes that she felt slightly better after taking the fifth dose of her antibiotic but continues to feel unwell. She expresses concern about potential transmission of her illness to her , who is also ill. She has a history of using an albuterol inhaler a few years ago and found it beneficial. She inquired about the availability of cough medicine containing codeine. Despite not being tested for COVID-19 or influenza, she was  "prescribed an antibiotic regimen for a suspected viral infection.    - Onset: Symptoms started 7 days before the urgent care visit.  - Location: Throat, lungs, nose, ears, legs, and joints.  - Duration: Persistent symptoms for over a week.  - Character: Congestion, dizziness, lightheadedness, sore throat, earache, cough with sputum, generalized body aches, loss of taste, swollen throat, malaise, spontaneous headaches, deep-seated mucus, shortness of breath, wheezing, clammy feeling upon waking, yellow mucus with dry blood, blood-tinged sputum.  - Alleviating/Aggravating Factors: Mucinex and Tylenol provide some relief; muscle relaxer aids in sleep but does not alleviate pain; wheezing when lying down; slight improvement after the fifth dose of antibiotic.  - Timing: Persistent coughing, daily use of Mucinex and Tylenol, muscle relaxer at night.  - Severity: Oxygen saturation recorded as 88 at urgent care; persistent feeling of unwellness; concern about potential transmission to .    MEDICATIONS  - Mucinex  - Tylenol  - Z-Christopher    Health Maintenance: schedule AWV    Objective:     Exam:  /70 (BP Location: Left arm, Patient Position: Sitting, BP Cuff Size: Adult)   Pulse 78   Temp 36.2 °C (97.2 °F) (Temporal)   Ht 1.664 m (5' 5.5\")   Wt 87.8 kg (193 lb 9.6 oz)   LMP  (LMP Unknown)   SpO2 93%   BMI 31.73 kg/m²  Body mass index is 31.73 kg/m².    Physical Exam  Constitutional:       Appearance: Normal appearance.   HENT:      Head: Normocephalic and atraumatic.   Cardiovascular:      Rate and Rhythm: Normal rate.      Pulses: Normal pulses.   Pulmonary:      Effort: Pulmonary effort is normal.      Breath sounds: No decreased air movement. Examination of the left-upper field reveals wheezing. Examination of the right-lower field reveals rhonchi. Examination of the left-lower field reveals rhonchi. Wheezing and rhonchi present. No rales.   Skin:     General: Skin is warm and dry.   Neurological:      " General: No focal deficit present.      Mental Status: She is alert and oriented to person, place, and time.           Results      Assessment & Plan:     1. Pneumonia of both lower lobes due to infectious organism  doxycycline (MONODOX) 100 MG capsule    albuterol (PROAIR HFA) 108 (90 Base) MCG/ACT Aero Soln inhalation aerosol    DX-CHEST-2 VIEWS      2. Acute cough  POCT CoV-2, Flu A/B, RSV by PCR      3. Obesity (BMI 30-39.9)  Patient identified as having weight management issue.  Appropriate orders and counseling given.          Assessment & Plan  1. Suspected pneumonia: Persistent symptoms for 14 days without improvement. Oxygen saturation was 88 at the urgent care visit and is now 93. Differential diagnosis includes COVID-19, influenza A and B, and RSV.  - Conduct a swab test to rule out COVID-19, influenza A and B, and RSV  - Prescribe doxycycline to cover bacterial possibilities  - Provide an albuterol inhaler for use during episodes of shortness of breath or coughing, particularly before bedtime  - Order a chest x-ray to further investigate the condition  - Advise to continue wearing a mask  - Instruct to inform if there is no improvement within a few days    Follow-up  - Call with swab test results        Return in about 2 weeks (around 2/18/2025), or if symptoms worsen or fail to improve.    I have placed the below orders and discussed them with an approved delegating provider. The MA is performing the below orders under the direction of Dr. Plascencia.      Please note that this dictation was created using voice recognition software. I have made every reasonable attempt to correct obvious errors, but I expect that there are errors of grammar and possibly content that I did not discover before finalizing the note.

## 2025-02-17 DIAGNOSIS — E03.4 HYPOTHYROIDISM DUE TO ACQUIRED ATROPHY OF THYROID: ICD-10-CM

## 2025-02-18 RX ORDER — LEVOTHYROXINE SODIUM 88 UG/1
88 TABLET ORAL
Qty: 90 TABLET | Refills: 0 | Status: SHIPPED | OUTPATIENT
Start: 2025-02-18

## 2025-02-18 NOTE — TELEPHONE ENCOUNTER
Received request via: Pharmacy    Was the patient seen in the last year in this department? Yes    Does the patient have an active prescription (recently filled or refills available) for medication(s) requested? No    [facility]    Does the patient have [facility] Plus and need 100-day supply? (This applies to ALL medications) Patient does not have SCP

## 2025-02-27 ENCOUNTER — APPOINTMENT (OUTPATIENT)
Dept: MEDICAL GROUP | Facility: PHYSICIAN GROUP | Age: 70
End: 2025-02-27
Payer: MEDICARE

## 2025-05-22 ENCOUNTER — OFFICE VISIT (OUTPATIENT)
Dept: DERMATOLOGY | Facility: IMAGING CENTER | Age: 70
End: 2025-05-22
Payer: MEDICARE

## 2025-05-22 DIAGNOSIS — L82.0 INFLAMED SEBORRHEIC KERATOSIS: Primary | ICD-10-CM

## 2025-05-22 PROCEDURE — 17110 DESTRUCTION B9 LES UP TO 14: CPT | Performed by: NURSE PRACTITIONER

## 2025-05-22 NOTE — PROGRESS NOTES
DERMATOLOGY NOTE  FOLLOW UP VISIT       Chief complaint: Follow-Up and Skin Lesion     HPI/location: Right breast  Time present: 1 week  Painful lesion: No  Itching lesion: No  Enlarging lesion: No        History of skin cancer: Yes, Details: melanoma  right cheek 11 years ago, had 3 surgeries, finally went to plastics--resolved, not sure if was MOHS   History of precancers/actinic keratoses: Yes, Details:    History of biopsies:Yes, Details:    History of blistering/severe sunburns:Yes, Details:    Family history of skin cancer: father , type unknown  , bother BCC   Family history of atypical moles:No        Allergies[1]     MEDICATIONS:  Medications relevant to specialty reviewed.     REVIEW OF SYSTEMS:   Positive for skin (see HPI)  Negative for fevers and chills       EXAM:  LMP  (LMP Unknown)   Constitutional: Well-developed, well-nourished, and in no distress.     A focused skin exam was performed including the affected areas of the trunk. Notable findings on exam today listed below and/or in assessment/plan.     Pink/tan inflamed waxy stuck on appearing papule within light brown macule to R breast in 3 o'clock position    IMPRESSION / PLAN:    1. Inflamed seborrheic keratosis (favored)  CRYOTHERAPY:  Risks (including, but not limited to: skin discoloration, redness, blister, blood blister, recurrence, need for further treatment, infection, scar) and benefits of cryotherapy discussed. Patient verbally agreed to proceed with treatment. 1 cryotherapy freeze thaw cycles of 10 seconds were applied to 1 lesion on R breast with cryac. Patient tolerated procedure well. Aftercare instructions given--no specific care needed unless irritated during healing process, can apply Vaseline with small band-aid if needed.  Pt advised to follow up 6 weeks if no improvement      Pt understands risks associated with LN2 Patient verbalized understanding and agrees with plan regarding the above            Please note that this  dictation was created using voice recognition software. I have made every reasonable attempt to correct obvious errors, but I expect that there are errors of grammar and possibly content that I did not discover before finalizing the note.      Return to clinic in: Return for PRN no improvement in 6 weeks. and as needed for any new or changing skin lesions.             [1]   Allergies  Allergen Reactions    Grass Pollen(K-O-R-T-Swt Robby)

## 2025-05-25 DIAGNOSIS — E03.4 HYPOTHYROIDISM DUE TO ACQUIRED ATROPHY OF THYROID: ICD-10-CM

## 2025-05-27 RX ORDER — LEVOTHYROXINE SODIUM 88 UG/1
88 TABLET ORAL
Qty: 90 TABLET | Refills: 3 | Status: SHIPPED | OUTPATIENT
Start: 2025-05-27

## 2025-05-27 NOTE — TELEPHONE ENCOUNTER
Received request via: Pharmacy    Was the patient seen in the last year in this department? Yes    Does the patient have an active prescription (recently filled or refills available) for medication(s) requested? No    Pharmacy Name:     Does the patient have care home Plus and need 100-day supply? (This applies to ALL medications)

## 2025-06-10 ENCOUNTER — TELEPHONE (OUTPATIENT)
Dept: FAMILY PLANNING/WOMEN'S HEALTH CLINIC | Facility: PHYSICIAN GROUP | Age: 70
End: 2025-06-10